# Patient Record
Sex: FEMALE | Race: WHITE | NOT HISPANIC OR LATINO | Employment: FULL TIME | URBAN - METROPOLITAN AREA
[De-identification: names, ages, dates, MRNs, and addresses within clinical notes are randomized per-mention and may not be internally consistent; named-entity substitution may affect disease eponyms.]

---

## 2017-01-05 ENCOUNTER — TELEPHONE (OUTPATIENT)
Dept: INTERNAL MEDICINE | Facility: CLINIC | Age: 52
End: 2017-01-05

## 2017-01-05 DIAGNOSIS — G47.00 INSOMNIA, UNSPECIFIED TYPE: Primary | ICD-10-CM

## 2017-01-05 RX ORDER — TRAZODONE HYDROCHLORIDE 50 MG/1
50 TABLET ORAL NIGHTLY
Qty: 30 TABLET | Refills: 1 | Status: SHIPPED | OUTPATIENT
Start: 2017-01-05 | End: 2017-03-01 | Stop reason: SDUPTHER

## 2017-01-05 NOTE — TELEPHONE ENCOUNTER
----- Message from Lashaun Castillo MA sent at 1/5/2017  8:23 AM EST -----  Please advise.   ----- Message -----     From: Kate Clark     Sent: 1/5/2017   8:19 AM       To: [facility] [place]    The patient is calling to see if she can get another sleep aide called in for her. She said she called last week and no one called her back. She is aware of her sleep consult at the end of the month but she is not able to sleep at all. She also has a cold and it is making her sleep worse, so she wanted to see if something could be called in for that as well. [facility], and she can be reached at [phone].

## 2017-01-05 NOTE — TELEPHONE ENCOUNTER
I'm sorry I did not get a message last week. I sent in trazodone for her to try for sleep instead of the vistaril. She can try otc mucinex DM and Advil cold and sinus for her cold symptoms.

## 2017-01-31 ENCOUNTER — CONSULT (OUTPATIENT)
Dept: SLEEP MEDICINE | Facility: HOSPITAL | Age: 52
End: 2017-01-31

## 2017-01-31 VITALS
HEART RATE: 91 BPM | BODY MASS INDEX: 35.28 KG/M2 | WEIGHT: 175 LBS | DIASTOLIC BLOOD PRESSURE: 98 MMHG | OXYGEN SATURATION: 97 % | SYSTOLIC BLOOD PRESSURE: 208 MMHG | HEIGHT: 59 IN

## 2017-01-31 DIAGNOSIS — F51.04 PSYCHOPHYSIOLOGICAL INSOMNIA: ICD-10-CM

## 2017-01-31 DIAGNOSIS — E66.9 OBESITY (BMI 30-39.9): ICD-10-CM

## 2017-01-31 DIAGNOSIS — R29.818 SUSPECTED SLEEP APNEA: Primary | ICD-10-CM

## 2017-01-31 DIAGNOSIS — G47.10 HYPERSOMNIA: ICD-10-CM

## 2017-01-31 PROBLEM — B35.1 ONYCHOMYCOSIS: Status: ACTIVE | Noted: 2017-01-31

## 2017-01-31 PROCEDURE — 99204 OFFICE O/P NEW MOD 45 MIN: CPT | Performed by: INTERNAL MEDICINE

## 2017-01-31 RX ORDER — CHOLECALCIFEROL (VITAMIN D3) 125 MCG
5 CAPSULE ORAL
COMMUNITY

## 2017-01-31 NOTE — MR AVS SNAPSHOT
[facility] SLEEP MEDICINE  [phone]                    Adriana Diamond   1/31/2017 8:00 AM   Consult    Dept Phone:  [phone]   Encounter #:  [id number]    Provider:  Janes Allen MD   Department:  [facility] SLEEP MEDICINE                Your Full Care Plan              Your Updated Medication List          This list is accurate as of: 1/31/17  9:19 AM.  Always use your most recent med list.                amLODIPine 5 MG tablet   Commonly known as:  NORVASC   Take 1 tablet by mouth Daily.       clindamycin 1 % gel   Commonly known as:  CLINDAGEL   Apply  topically 2 (Two) Times a Day.       clindamycin 300 MG capsule   Commonly known as:  CLEOCIN   Take 1 capsule by mouth 3 (Three) Times a Day.       LAMISIL AT 1 % cream   Generic drug:  terbinafine       melatonin 5 MG tablet tablet       MULTI-VITAMIN GUMMIES chewable tablet       mupirocin 2 % ointment   Commonly known as:  BACTROBAN   Apply  topically 3 (Three) Times a Day.       nystatin-triamcinolone 890483-0.1 UNIT/GM-% cream   Commonly known as:  MYCOLOG II       tiZANidine 4 MG tablet   Commonly known as:  ZANAFLEX       traZODone 50 MG tablet   Commonly known as:  DESYREL   Take 1 tablet by mouth Every Night.       ZEGERID PO               You Were Diagnosed With        Codes Comments    Suspected sleep apnea    -  Primary ICD-10-CM: G47.30  ICD-9-CM: 780.57     Psychophysiological insomnia     ICD-10-CM: F51.04  ICD-9-CM: 307.42     Obesity (BMI 30-39.9)     ICD-10-CM: E66.9  ICD-9-CM: 278.00     Hypersomnia     ICD-10-CM: G47.10  ICD-9-CM: 780.54       Instructions     None    Patient Instructions History      Upcoming Appointments     Visit Type Date Time Department    CONSULT 1/31/2017  8:00 AM [facility]    HOME SLEEP STUDY 2/10/2017  2:15 PM  [facility]    FOLLOW UP 4/11/2017  8:45 AM [facility]      MyChart Signup     Our records indicate that your [facility]  "[facility] account has been deactivated. If you would like to reactivate your account, please email [email or web address] or call [phone] to talk to our [facility] staff.             Other Info from Your Visit           Your Appointments     Feb 10, 2017  2:15 PM EST   HOME SLEEP STUDY with  [facility] NIGHTONE 1   [facility] ([place])    [address] [address] [phone]            Apr 11, 2017  8:45 AM EDT   Follow Up with Janes Allen MD   [facility] (--)    [address] [address] [phone]           Please bring completed new patient packets that were mailed to you prior to follow up as well as bringing a copy of insurnace card and photo ID to visit. Please bring downloadable chips/cards out of machines if you are on PAP therapy.              Allergies     Bactrim [Sulfamethoxazole-trimethoprim]        Reason for Visit     Sleeping Problem           Vital Signs     Blood Pressure Pulse Height Weight Oxygen Saturation Body Mass Index    208/98 91 59\" (149.9 cm) 175 lb (79.4 kg) 97% 35.35 kg/m2    Smoking Status                   Never Smoker           Problems and Diagnoses Noted     Hypersomnia    Obesity (BMI 30-39.9)    Difficulty falling or staying asleep    Suspected sleep apnea        "

## 2017-01-31 NOTE — PROGRESS NOTES
Subjective   Adriana Diamond is a 51 y.o. female.   Chief Complaint   Patient presents with   • Sleeping Problem       HPI     51 y.o. female seen in consultation at the request of Judith Negron PA for evaluation of the above.     She has had some neck pain and has bone spurs in her neck.  She went to the [facility] in [place].  They elicited signs and symptoms suggestive of obstructive sleep apnea.  She was originally scheduled for a in lab sleep study in [facility] but this was denied by her insurance.  Her primary care provider referred her to us.    She has disturbed sleep.  She awakens frequently at night and is drowsy upon awakening in the morning.  Her  has noted apneas as well as snoring.    Further details are as follows:    Delbarton Scale is: 4/24    Estimated average amount of sleep per night: 5  Number of times she wakes up at night: 0  Difficulty falling back asleep: yes  It usually takes 30 minutes to go to sleep.  She feels sleepy upon waking up: yes  Rotating or night shift work: no    Drowsiness/Sleepiness:  She exhibits the following:  excessive daytime sleepiness    Snoring/Breathing:  She exhibits the following:  awoken with dry mouth  quits breathing at night  awakens gasping for breath  morning headaches    Reflux:  She describes the following:  wakes up at night with a sour taste or burning sensation in chest  takes medication for reflux    Narcolepsy:  She exhibits the following:  none    RLS/PLMs:  She describes the following:  moves or jerks during sleep    Insomnia:  She describes the following:  problems initiating sleep at night    Parasomnia:  She exhibits the following:  none    Neurological:  She has a history of the following:  none    Weight:  Weight change in the last year:  gain: 0 lbs      The patient's relevant past medical, surgical, family, and social history reviewed and updated in Epic as appropriate.    Current medications are:   Current  "Outpatient Prescriptions:   •  melatonin 5 MG tablet tablet, Take 5 mg by mouth., Disp: , Rfl:   •  Multiple Vitamins-Minerals (MULTI-VITAMIN GUMMIES) chewable tablet, Chew daily., Disp: , Rfl:   •  Omeprazole-Sodium Bicarbonate (ZEGERID PO), Take  by mouth., Disp: , Rfl:   •  tiZANidine (ZANAFLEX) 4 MG tablet, Take  by mouth., Disp: , Rfl:   •  traZODone (DESYREL) 50 MG tablet, Take 1 tablet by mouth Every Night., Disp: 30 tablet, Rfl: 1  •  amLODIPine (NORVASC) 5 MG tablet, Take 1 tablet by mouth Daily., Disp: 30 tablet, Rfl: 2  •  clindamycin (CLEOCIN) 300 MG capsule, Take 1 capsule by mouth 3 (Three) Times a Day., Disp: 21 capsule, Rfl: 0  •  clindamycin (CLINDAGEL) 1 % gel, Apply  topically 2 (Two) Times a Day., Disp: 30 g, Rfl: 1  •  mupirocin (BACTROBAN) 2 % ointment, Apply  topically 3 (Three) Times a Day., Disp: 22 g, Rfl: 0  •  nystatin-triamcinolone (MYCOLOG II) 964868-7.1 UNIT/GM-% cream, Apply  topically., Disp: , Rfl:   •  terbinafine (LAMISIL AT) 1 % cream, Apply  topically., Disp: , Rfl: .    Review of Systems    Review of Systems  ROS documented in patient questionnaire ×12 systems.  Reviewed with patient.  Otherwise negative except as noted in HPI.    Physical Exam    Blood pressure (!) 208/98, pulse 91, height 59\" (149.9 cm), weight 175 lb (79.4 kg), SpO2 97 %. Body mass index is 35.35 kg/(m^2).    Physical Exam   Constitutional: She is oriented to person, place, and time. She appears well-developed and well-nourished.   HENT:   Head: Normocephalic and atraumatic.   Nose: Nose normal.   Mouth/Throat: Oropharynx is clear and moist. No oropharyngeal exudate.   Class IV airway   Eyes: Conjunctivae are normal. Pupils are equal, round, and reactive to light. No scleral icterus.   Neck: Neck supple. No tracheal deviation present. No thyromegaly present.   Cardiovascular: Normal rate and regular rhythm.  Exam reveals no gallop and no friction rub.    No murmur heard.  Pulmonary/Chest: Effort normal. No " respiratory distress. She has no wheezes. She has no rales. She exhibits no tenderness.   Abdominal: Soft. Bowel sounds are normal. She exhibits no distension. There is no tenderness.   Musculoskeletal: She exhibits no edema or deformity.   Neurological: She is alert and oriented to person, place, and time.   Skin: Skin is warm and dry. No rash noted.   Psychiatric: She has a normal mood and affect. Her behavior is normal. Judgment and thought content normal.   Nursing note and vitals reviewed.      ASSESSMENT:    Problem List Items Addressed This Visit     Suspected sleep apnea - Primary    Relevant Orders    Home Sleep Study    Hypersomnia    Psychophysiological insomnia    Obesity (BMI 30-39.9)    Relevant Orders    Home Sleep Study          Evidence of obstructive sleep apnea based upon her snoring, witnessed apneas, nonrestorative sleep, and at risk body habitus and airway.    PLAN:    A home sleep study should be adequate for diagnosis.    The risks of untreated obstructive sleep apnea were discussed as well as treatment options. I have ordered a home polysomnogram. We discussed initiating CPAP if appropriate.  There will be a long-term goal of weight loss. I have asked her to follow-up after the sleep study is complete.    I have reviewed the results of my evaluation and impression with the patient and/or appropriate family.      Signed by  Janes Allen MD    January 31, 2017      CC: MARLENY Boo Sarah E, PA

## 2017-01-31 NOTE — LETTER
January 31, 2017     MARLENY Grullon  [address]  [place] [address]    Patient: Adriana Diamond   YOB: 1965   Date of Visit: 1/31/2017       Dear MARLENY Escudero:    Thank you for referring Adriana Diamond to me for evaluation. Below are the relevant portions of my assessment and plan of care.    If you have questions, please do not hesitate to call me. I look forward to following Adriana along with you.         Sincerely,        Janes Allen MD        CC: No Recipients

## 2017-02-10 ENCOUNTER — HOSPITAL ENCOUNTER (OUTPATIENT)
Dept: SLEEP MEDICINE | Facility: HOSPITAL | Age: 52
Discharge: HOME | End: 2017-02-10
Attending: INTERNAL MEDICINE | Admitting: INTERNAL MEDICINE

## 2017-02-10 VITALS
HEIGHT: 59 IN | OXYGEN SATURATION: 95 % | HEART RATE: 81 BPM | WEIGHT: 174.8 LBS | BODY MASS INDEX: 35.24 KG/M2 | DIASTOLIC BLOOD PRESSURE: 80 MMHG | SYSTOLIC BLOOD PRESSURE: 166 MMHG

## 2017-02-10 DIAGNOSIS — E66.9 OBESITY (BMI 30-39.9): ICD-10-CM

## 2017-02-10 DIAGNOSIS — R29.818 SUSPECTED SLEEP APNEA: ICD-10-CM

## 2017-02-10 PROCEDURE — 95806 SLEEP STUDY UNATT&RESP EFFT: CPT | Performed by: INTERNAL MEDICINE

## 2017-02-13 DIAGNOSIS — R06.83 SNORING: ICD-10-CM

## 2017-02-13 DIAGNOSIS — G47.33 OBSTRUCTIVE SLEEP APNEA, ADULT: Primary | ICD-10-CM

## 2017-02-14 NOTE — PROGRESS NOTES
Patient wants to wait to be setup on CPAP she will can insurance and verify cost and speak to her  about the treatment. She said she will call back to reschedule her appointment for a sooner time if needed.

## 2017-02-16 ENCOUNTER — TELEPHONE (OUTPATIENT)
Dept: SLEEP MEDICINE | Facility: HOSPITAL | Age: 52
End: 2017-02-16

## 2017-02-16 NOTE — TELEPHONE ENCOUNTER
Patient called and wants to commence with ordering of CPAP equipment for NICKY, she has changed her mind from when she spoke with Soledad the other day.    She had called her insurance company and gotten a list of DME companies and based on proximity to her home she has chosen to use [facility].      We will send all appropriate orders, notes, and [facility] to get her setup.  She was instructed about DME mask policy during first 30 days (that she can go through however many masks she needs to in order to get correct fit and find mask most comfortable for her) and she acknowledged this verbally.    She was instructed to call us back if she has not heard from [facility] within 3 business days letting her know that they are working on her case.

## 2017-03-01 DIAGNOSIS — G47.00 INSOMNIA, UNSPECIFIED TYPE: ICD-10-CM

## 2017-03-01 RX ORDER — TRAZODONE HYDROCHLORIDE 50 MG/1
50 TABLET ORAL NIGHTLY PRN
Qty: 30 TABLET | Refills: 2 | Status: SHIPPED | OUTPATIENT
Start: 2017-03-01 | End: 2017-06-23

## 2017-03-01 NOTE — TELEPHONE ENCOUNTER
She would need to come in for appt to discuss a change in medication. Sent in refill of trazodone for now.

## 2017-03-02 ENCOUNTER — TELEPHONE (OUTPATIENT)
Dept: SLEEP MEDICINE | Facility: HOSPITAL | Age: 52
End: 2017-03-02

## 2017-03-02 DIAGNOSIS — G47.33 OBSTRUCTIVE SLEEP APNEA, ADULT: Primary | ICD-10-CM

## 2017-03-02 DIAGNOSIS — R06.83 SNORING: ICD-10-CM

## 2017-03-02 NOTE — TELEPHONE ENCOUNTER
Patient called stating that she started on CPAP last week and she is having trouble adjusting to the pressure of the machine. [facility] and her RT suggested that she contact the doctors office to see about getting a pressure change.       After Yani review of the download machine is treating patient NICKY and has even dropped patient AHI averaging to 1.7.  Yani decreased patient min pressure to 6 via [employer].     Also preparing an order for Dr. Allen to sign.

## 2017-03-15 ENCOUNTER — TELEPHONE (OUTPATIENT)
Dept: SLEEP MEDICINE | Facility: HOSPITAL | Age: 52
End: 2017-03-15

## 2017-03-15 NOTE — TELEPHONE ENCOUNTER
Patient called in and said that she still wakes up tired every morning and that her CPAP machine doesn't seem to be helping sleep. She fights with the mask all night.    Pressure was changed recently.    She is going to try a new mask and will call back she is still having issues.

## 2017-04-11 ENCOUNTER — OFFICE VISIT (OUTPATIENT)
Dept: SLEEP MEDICINE | Facility: HOSPITAL | Age: 52
End: 2017-04-11

## 2017-04-11 VITALS
BODY MASS INDEX: 34.88 KG/M2 | DIASTOLIC BLOOD PRESSURE: 96 MMHG | HEIGHT: 59 IN | HEART RATE: 87 BPM | OXYGEN SATURATION: 94 % | WEIGHT: 173 LBS | SYSTOLIC BLOOD PRESSURE: 146 MMHG

## 2017-04-11 DIAGNOSIS — F51.04 PSYCHOPHYSIOLOGICAL INSOMNIA: ICD-10-CM

## 2017-04-11 DIAGNOSIS — G47.33 SEVERE OBSTRUCTIVE SLEEP APNEA: Primary | ICD-10-CM

## 2017-04-11 DIAGNOSIS — Z78.9 INTOLERANCE OF CONTINUOUS POSITIVE AIRWAY PRESSURE (CPAP) VENTILATION: ICD-10-CM

## 2017-04-11 DIAGNOSIS — G47.10 HYPERSOMNIA: ICD-10-CM

## 2017-04-11 DIAGNOSIS — R06.83 SNORING: ICD-10-CM

## 2017-04-11 DIAGNOSIS — E66.9 OBESITY (BMI 30-39.9): ICD-10-CM

## 2017-04-11 PROCEDURE — 99214 OFFICE O/P EST MOD 30 MIN: CPT | Performed by: INTERNAL MEDICINE

## 2017-04-11 NOTE — PROGRESS NOTES
Subjective:     Chief Complaint:   Chief Complaint   Patient presents with   • Follow-up       HPI:    Adriana Diamond is a 51 y.o. female here for follow-up of obstructive sleep apnea.    She is doing very poorly with her CPAP therapy.  Her sleep study revealed a severe degree of obstructive sleep apnea.  She was initiated on auto CPAP in February.  Her download indicates that she has used her machine an average of just under 5 hours per night.  She has been compliant with therapy but says she is doing this just to stay compliant with insurance requirements.  She says her sleep is worse than ever.  She is having problems with the pressure and cannot stand mask on her face.  She awakens multiple times per night.  This causes her  to awaken as well.  She has tried several masks including a full facemask, an Nadja view mask, and a nasal mask.  She is convinced that CPAP is not an option for her and wants to discontinue it and consider other options.      Since starting PAP sleep problem has: remained the same  Currently using PAP: yes Hours of usage during the night: 5    Amount of sleep per night : 4 hours  Average length of time it takes to fall asleep : 30 minutes  Number of awakenings per night : 2     She feels fatigue (tiredness, exhaustion, lethargy) in the daytime even when not sleepy? Occasionally   She feels sleepy (or struggles to stay awake) in the daytime? Occasionally     Hayward Scale scored as 7/24.    Type of mask: nasal mask    She (since starting PAP or since last visit) has problems with the following:   Pressure from the mask 1 - Mild Problems  Skin irritation from the mask 0 - No Problem  Mask coming off face 3 - Mild Problems  Air leaks from the mask 3 - Mild Problems  Dry mouth or throat 3 - Mild Problems  Nasal congestion 0 - No Problem    I reviewed her PAP download:  Average pressure: 11  Average AHI:  2  Average minutes in large leak per night: 25      Current medications are:    Current Outpatient Prescriptions:   •  melatonin 5 MG tablet tablet, Take 5 mg by mouth., Disp: , Rfl:   •  Multiple Vitamins-Minerals (MULTI-VITAMIN GUMMIES) chewable tablet, Chew daily., Disp: , Rfl:   •  Omeprazole-Sodium Bicarbonate (ZEGERID PO), Take  by mouth., Disp: , Rfl:   •  TETRACYCLINE HCL PO, Take  by mouth. Has about a week left as of 2/10/17, Disp: , Rfl:   •  traZODone (DESYREL) 50 MG tablet, Take 1 tablet by mouth At Night As Needed for sleep., Disp: 30 tablet, Rfl: 2  •  Eletriptan Hydrobromide (RELPAX PO), Take  by mouth. With headache, Disp: , Rfl:   •  tiZANidine (ZANAFLEX) 4 MG tablet, Take  by mouth., Disp: , Rfl: .      The patient's relevant past medical, surgical, family and social history were reviewed and updated in Epic as appropriate.     ROS:   she is doing fairlyAs  Review of Systems   Constitutional: Positive for fatigue.   Respiratory: Positive for apnea.    Psychiatric/Behavioral: Positive for sleep disturbance.         Objective:    Physical Exam   Constitutional: She is oriented to person, place, and time. She appears well-developed and well-nourished.   HENT:   Head: Normocephalic and atraumatic.   Mouth/Throat: Oropharynx is clear and moist.   Class IV airway   Neck: Neck supple. No thyromegaly present.   Cardiovascular: Normal rate and regular rhythm.  Exam reveals no gallop and no friction rub.    No murmur heard.  Pulmonary/Chest: Effort normal. No respiratory distress. She has no wheezes. She has no rales.   Musculoskeletal: She exhibits no edema.   Neurological: She is alert and oriented to person, place, and time.   Skin: Skin is warm and dry.   Psychiatric: She has a normal mood and affect. Her behavior is normal.   Vitals reviewed.      Data:    Patient's PAP download was personally reviewed including raw data and results.    Assessment:    Problem List Items Addressed This Visit        Pulmonary Problems    Severe obstructive sleep apnea - Primary    Overview      Intolerant of CPAP         Relevant Orders    PAP Therapy    Ambulatory Referral to Dentistry    Ambulatory Referral to ENT (Otolaryngology) (Completed)    Snoring       Other    Hypersomnia    Intolerance of continuous positive airway pressure (CPAP) ventilation    Psychophysiological insomnia    Obesity (BMI 30-39.9)        She is basically intolerant of CPAP therapy.  She has tried multiple masks and her pressures are acceptable.  I don't think CPAP is a viable option for her given my discussion with her today.  I went over other options for treatment of her sleep apnea.  They basically consists of an oral appliance, surgery, and long-term weight loss.  She needs to perform the latter in any event but will need one of the other treatments in the near term.        Plan:     -She has seen Dr. Sexton in the past and wants to have a consult with him regarding the possibility of sleep apnea surgery.  -We will refer her to one of our dental specialist regarding the possibility of an oral appliance   -Long-term weight loss was discussed   -I also went over cognitive behavioral therapy for insomnia as well as sleep hygiene and gave her some resources for both of those   -Would not recommend sedative therapy for insomnia given her untreated sleep apnea this point   -Discussed all the above in detail with her       Signed by  Janes Allen MD

## 2017-05-05 ENCOUNTER — LAB REQUISITION (OUTPATIENT)
Dept: LAB | Facility: HOSPITAL | Age: 52
End: 2017-05-05

## 2017-05-05 DIAGNOSIS — R19.4 CHANGE IN BOWEL HABITS: ICD-10-CM

## 2017-05-05 PROCEDURE — 88305 TISSUE EXAM BY PATHOLOGIST: CPT | Performed by: INTERNAL MEDICINE

## 2017-05-09 LAB
CYTO UR: NORMAL
LAB AP CASE REPORT: NORMAL
LAB AP CLINICAL INFORMATION: NORMAL
LAB AP DIAGNOSIS COMMENT: NORMAL
Lab: NORMAL
PATH REPORT.ADDENDUM SPEC: NORMAL
PATH REPORT.FINAL DX SPEC: NORMAL
PATH REPORT.GROSS SPEC: NORMAL

## 2017-06-23 ENCOUNTER — OFFICE VISIT (OUTPATIENT)
Dept: INTERNAL MEDICINE | Facility: CLINIC | Age: 52
End: 2017-06-23

## 2017-06-23 VITALS
DIASTOLIC BLOOD PRESSURE: 80 MMHG | HEART RATE: 71 BPM | SYSTOLIC BLOOD PRESSURE: 138 MMHG | BODY MASS INDEX: 34.54 KG/M2 | WEIGHT: 171 LBS | OXYGEN SATURATION: 98 %

## 2017-06-23 DIAGNOSIS — G47.00 INSOMNIA, UNSPECIFIED TYPE: ICD-10-CM

## 2017-06-23 DIAGNOSIS — E78.5 HYPERLIPIDEMIA, UNSPECIFIED HYPERLIPIDEMIA TYPE: Primary | ICD-10-CM

## 2017-06-23 DIAGNOSIS — H53.9 VISUAL CHANGES: ICD-10-CM

## 2017-06-23 LAB — GLUCOSE BLDC GLUCOMTR-MCNC: 113 MG/DL (ref 70–130)

## 2017-06-23 PROCEDURE — 99213 OFFICE O/P EST LOW 20 MIN: CPT | Performed by: NURSE PRACTITIONER

## 2017-06-23 PROCEDURE — 82947 ASSAY GLUCOSE BLOOD QUANT: CPT | Performed by: NURSE PRACTITIONER

## 2017-06-23 RX ORDER — ZOLPIDEM TARTRATE 5 MG/1
5 TABLET ORAL NIGHTLY PRN
Qty: 30 TABLET | Refills: 0 | Status: SHIPPED | OUTPATIENT
Start: 2017-06-23 | End: 2017-07-25 | Stop reason: SDUPTHER

## 2017-06-23 NOTE — PROGRESS NOTES
Subjective  Blurred Vision (eyes feels strained, not focusing, ongoing since Monday ) and Med Refill (would like to discuss sleeping medication )      Adriana Diamond is a 51 y.o. female.   Allergies   Allergen Reactions   • Bactrim [Sulfamethoxazole-Trimethoprim]      Insomnia   This is a recurrent problem. The current episode started more than 1 month ago. The problem has been gradually worsening. Associated symptoms include headaches and a visual change. Pertinent negatives include no vomiting. Nothing aggravates the symptoms. She has tried lying down, NSAIDs, position changes, relaxation and rest for the symptoms. The treatment provided no relief.   Eye Problem    Both eyes are affected.This is a new problem. The current episode started in the past 7 days. The problem occurs constantly. The injury mechanism is unknown. The patient is experiencing no pain. There is no known exposure to pink eye. She does not wear contacts. Associated symptoms include blurred vision. Pertinent negatives include no recent URI or vomiting. She has tried nothing for the symptoms. The treatment provided no relief.         Feels something odd in forehead, does not feel like h/a, took aleve w/o relief, eyes are blurry and fuzzy, feels behind the eyes , x 5 days , constant , eyes feel extra tired and heavy  Needs sleep med , tried trazodone and vistaril did not help, did not make her sleep , made a little drowsy  The following portions of the patient's history were reviewed and updated as appropriate: allergies, current medications, past family history, past medical history, past social history, past surgical history and problem list.    Review of Systems   Eyes: Positive for blurred vision and visual disturbance.   Gastrointestinal: Negative for vomiting.   Neurological: Positive for headaches.   Psychiatric/Behavioral: Positive for sleep disturbance. The patient has insomnia.    All other systems reviewed and are  negative.      Objective   Physical Exam  /80  Pulse 71  Wt 171 lb (77.6 kg)  SpO2 98%  BMI 34.54 kg/m2    Assessment/Plan     Problem List Items Addressed This Visit     None      Visit Diagnoses     Hyperlipidemia, unspecified hyperlipidemia type    -  Primary    Relevant Orders    Lipid panel    Insomnia, unspecified type        Relevant Medications    zolpidem (AMBIEN) 5 MG tablet    Visual changes        Relevant Orders    POCT Glucose (Completed)        Pt has oph that she sees and is going to get asap appt      The patient has read and signed the [facility] Controlled Substance Contract.  I will continue to see patient for regular follow up appointments.  They are well controlled on their medication.  ROVERTO has been reviewed by me and is updated every 3 months. The patient is aware of the potential for addiction and dependence.  Results for orders placed or performed in visit on 06/23/17   POCT Glucose   Result Value Ref Range    Glucose 113 70 - 130 mg/dL

## 2017-06-26 NOTE — PROGRESS NOTES
Subjective   Adriana Diamond is a 51 y.o. female.   Allergies   Allergen Reactions   • Bactrim [Sulfamethoxazole-Trimethoprim]      History of Present Illness      Vision blurry , feels funny inside her head, has not been sleeping so wonders if this has caused , is a constant problem x 2 weeks  The following portions of the patient's history were reviewed and updated as appropriate: allergies, current medications, past family history, past medical history, past social history, past surgical history and problem list.    Review of Systems   Eyes: Positive for visual disturbance.   Psychiatric/Behavioral: Positive for sleep disturbance.   All other systems reviewed and are negative.      Objective   Physical Exam   Constitutional: She is oriented to person, place, and time. She appears well-developed.   HENT:   Head: Normocephalic.   Eyes: Conjunctivae and EOM are normal. Pupils are equal, round, and reactive to light.   Cardiovascular: Normal rate, regular rhythm and normal heart sounds.    Pulmonary/Chest: Effort normal and breath sounds normal.   Neurological: She is alert and oriented to person, place, and time.   Skin: Skin is warm and dry.   Psychiatric: She has a normal mood and affect. Her behavior is normal. Judgment and thought content normal.     /80  Pulse 71  Wt 171 lb (77.6 kg)  SpO2 98%  BMI 34.54 kg/m2    Assessment/Plan     Problem List Items Addressed This Visit     None      Visit Diagnoses     Hyperlipidemia, unspecified hyperlipidemia type    -  Primary    Relevant Orders    Lipid panel    Insomnia, unspecified type        Relevant Medications    zolpidem (AMBIEN) 5 MG tablet    Visual changes        Relevant Orders    POCT Glucose (Completed)        Results for orders placed or performed in visit on 06/23/17   POCT Glucose   Result Value Ref Range    Glucose 113 70 - 130 mg/dL     Pt does have oph and will make stat appt with him.  The patient has read and signed the [facility]  Controlled Substance Contract.  I will continue to see patient for regular follow up appointments.  They are well controlled on their medication.  ROVERTO has been reviewed by me and is updated every 3 months. The patient is aware of the potential for addiction and dependence.

## 2017-07-19 ENCOUNTER — LAB (OUTPATIENT)
Dept: INTERNAL MEDICINE | Facility: CLINIC | Age: 52
End: 2017-07-19

## 2017-07-19 DIAGNOSIS — E78.5 HYPERLIPIDEMIA, UNSPECIFIED HYPERLIPIDEMIA TYPE: ICD-10-CM

## 2017-07-19 LAB
ARTICHOKE IGE QN: 179 MG/DL (ref 0–130)
CHOLEST SERPL-MCNC: 262 MG/DL (ref 0–200)
HDLC SERPL-MCNC: 61 MG/DL (ref 40–60)
TRIGL SERPL-MCNC: 204 MG/DL (ref 0–150)

## 2017-07-19 PROCEDURE — 80061 LIPID PANEL: CPT | Performed by: NURSE PRACTITIONER

## 2017-07-20 RX ORDER — PRAVASTATIN SODIUM 40 MG
40 TABLET ORAL DAILY
Qty: 30 TABLET | Refills: 5 | Status: SHIPPED | OUTPATIENT
Start: 2017-07-20 | End: 2018-02-10 | Stop reason: DRUGHIGH

## 2017-07-25 DIAGNOSIS — G47.00 INSOMNIA, UNSPECIFIED TYPE: ICD-10-CM

## 2017-07-26 RX ORDER — ZOLPIDEM TARTRATE 5 MG/1
5 TABLET ORAL NIGHTLY PRN
Qty: 30 TABLET | Refills: 0 | Status: SHIPPED | OUTPATIENT
Start: 2017-07-26 | End: 2017-09-27 | Stop reason: DRUGHIGH

## 2017-08-21 ENCOUNTER — TELEPHONE (OUTPATIENT)
Dept: ENDOCRINOLOGY | Facility: CLINIC | Age: 52
End: 2017-08-21

## 2017-08-21 NOTE — TELEPHONE ENCOUNTER
Pt called stating she is almost out of her Ambien and was wanting to know if she could get it stronger. Said she is still not getting enough rest.   You can reach pt at [phone]

## 2017-09-05 ENCOUNTER — TRANSCRIBE ORDERS (OUTPATIENT)
Dept: ADMINISTRATIVE | Facility: HOSPITAL | Age: 52
End: 2017-09-05

## 2017-09-05 DIAGNOSIS — Z12.31 VISIT FOR SCREENING MAMMOGRAM: Primary | ICD-10-CM

## 2017-09-25 DIAGNOSIS — G47.00 INSOMNIA, UNSPECIFIED TYPE: ICD-10-CM

## 2017-09-25 NOTE — TELEPHONE ENCOUNTER
PATIENT STATES THIS MEDICATION IS HELPING AND STATES IF AL WANTS TO INCREASE MEDICATION A LITTLE SHE WOULD LIKE THAT. SHE IS NEEDING REFILLS ON THIS MEDICATION

## 2017-09-27 ENCOUNTER — APPOINTMENT (OUTPATIENT)
Dept: MAMMOGRAPHY | Facility: HOSPITAL | Age: 52
End: 2017-09-27
Attending: OBSTETRICS & GYNECOLOGY

## 2017-09-27 ENCOUNTER — CLINICAL SUPPORT (OUTPATIENT)
Dept: INTERNAL MEDICINE | Facility: CLINIC | Age: 52
End: 2017-09-27

## 2017-09-27 ENCOUNTER — HOSPITAL ENCOUNTER (OUTPATIENT)
Dept: MAMMOGRAPHY | Facility: HOSPITAL | Age: 52
Discharge: HOME | End: 2017-09-27
Attending: OBSTETRICS & GYNECOLOGY | Admitting: OBSTETRICS & GYNECOLOGY

## 2017-09-27 DIAGNOSIS — Z12.31 VISIT FOR SCREENING MAMMOGRAM: ICD-10-CM

## 2017-09-27 PROCEDURE — G0202 SCR MAMMO BI INCL CAD: HCPCS

## 2017-09-27 PROCEDURE — 77063 BREAST TOMOSYNTHESIS BI: CPT

## 2017-09-27 PROCEDURE — 90471 IMMUNIZATION ADMIN: CPT | Performed by: NURSE PRACTITIONER

## 2017-09-27 PROCEDURE — 90686 IIV4 VACC NO PRSV 0.5 ML IM: CPT | Performed by: NURSE PRACTITIONER

## 2017-09-27 RX ORDER — ZOLPIDEM TARTRATE 5 MG/1
5 TABLET ORAL NIGHTLY PRN
Qty: 30 TABLET | Refills: 0 | OUTPATIENT
Start: 2017-09-27

## 2017-09-27 RX ORDER — ZOLPIDEM TARTRATE 10 MG/1
10 TABLET ORAL NIGHTLY PRN
Qty: 30 TABLET | Refills: 0 | Status: SHIPPED | OUTPATIENT
Start: 2017-09-27 | End: 2017-10-31 | Stop reason: SDUPTHER

## 2017-09-27 NOTE — TELEPHONE ENCOUNTER
Called and informed pt, per pt she has been taking 10 mg of ambien, on her bottle it says 10 mg, wondering if Adriana can increase dosage from 10, pls advise.

## 2017-09-29 PROCEDURE — 77067 SCR MAMMO BI INCL CAD: CPT | Performed by: RADIOLOGY

## 2017-09-29 PROCEDURE — 77063 BREAST TOMOSYNTHESIS BI: CPT | Performed by: RADIOLOGY

## 2017-10-30 ENCOUNTER — TELEPHONE (OUTPATIENT)
Dept: INTERNAL MEDICINE | Facility: CLINIC | Age: 52
End: 2017-10-30

## 2017-10-30 NOTE — TELEPHONE ENCOUNTER
If she needs refill she may have #30, nr but nothing in addition, kelton sure she wearing her cpap

## 2017-10-30 NOTE — TELEPHONE ENCOUNTER
[facility] IS REQUESTING REFILL ON AMBIEN.  SHE  IS FALLING ASLEEP BUT NOT STAYING ASLEEP IS THERE ANYTHING ELSE SHE CAN DO OR TAKE.      2ND REFILL WAS CALLED INTO [facility] BUT OTHERS WERE PICKED.  PLEASE CALL PATIENT IF IT IS CALLED IN OR IF SHE HAS TO COME IN TO OUR OFFICE

## 2017-10-30 NOTE — TELEPHONE ENCOUNTER
"Pt wants to know if she can have something other than ambien.  No longer has her cpap, turned it back in bc she \"could not use it\"  "

## 2017-10-31 RX ORDER — ZOLPIDEM TARTRATE 10 MG/1
10 TABLET ORAL NIGHTLY PRN
Qty: 30 TABLET | Refills: 0 | Status: SHIPPED | OUTPATIENT
Start: 2017-10-31 | End: 2018-01-09

## 2017-10-31 NOTE — TELEPHONE ENCOUNTER
Pt would like to  script this afternoon.  Please let me know when ready and I will take to front office.  Thank you

## 2017-12-05 RX ORDER — ZOLPIDEM TARTRATE 10 MG/1
10 TABLET ORAL NIGHTLY PRN
Qty: 30 TABLET | Refills: 0 | Status: CANCELLED | OUTPATIENT
Start: 2017-12-05

## 2017-12-05 NOTE — TELEPHONE ENCOUNTER
PATIENT WOULD LIKE TO KNOW IF SHE NEEDS TO HAVE LABS DRAWN PRIOR TO REFILL OF STATIN. SHE HAS REMAINING REFILLS BUT THINKS [facility] WANTED HER TO HAVE LABS DRAWN AGAIN 3 MONTHS AFTER HER LAST APPT.    CALL BACK

## 2017-12-12 ENCOUNTER — TELEPHONE (OUTPATIENT)
Dept: INTERNAL MEDICINE | Facility: CLINIC | Age: 52
End: 2017-12-12

## 2017-12-12 ENCOUNTER — OFFICE VISIT (OUTPATIENT)
Dept: INTERNAL MEDICINE | Facility: CLINIC | Age: 52
End: 2017-12-12

## 2017-12-12 VITALS
SYSTOLIC BLOOD PRESSURE: 132 MMHG | BODY MASS INDEX: 34.88 KG/M2 | WEIGHT: 173 LBS | DIASTOLIC BLOOD PRESSURE: 88 MMHG | HEART RATE: 96 BPM | RESPIRATION RATE: 16 BRPM | OXYGEN SATURATION: 98 % | HEIGHT: 59 IN

## 2017-12-12 DIAGNOSIS — G47.33 SEVERE OBSTRUCTIVE SLEEP APNEA: ICD-10-CM

## 2017-12-12 DIAGNOSIS — E78.5 HYPERLIPIDEMIA, UNSPECIFIED HYPERLIPIDEMIA TYPE: Primary | ICD-10-CM

## 2017-12-12 DIAGNOSIS — E66.9 OBESITY (BMI 30-39.9): ICD-10-CM

## 2017-12-12 LAB
ALBUMIN SERPL-MCNC: 4.4 G/DL (ref 3.2–4.8)
ALBUMIN/GLOB SERPL: 2.1 G/DL (ref 1.5–2.5)
ALP SERPL-CCNC: 99 U/L (ref 25–100)
ALT SERPL W P-5'-P-CCNC: 69 U/L (ref 7–40)
ANION GAP SERPL CALCULATED.3IONS-SCNC: 9 MMOL/L (ref 3–11)
ARTICHOKE IGE QN: 154 MG/DL (ref 0–130)
AST SERPL-CCNC: 33 U/L (ref 0–33)
BILIRUB SERPL-MCNC: 0.3 MG/DL (ref 0.3–1.2)
BUN BLD-MCNC: 19 MG/DL (ref 9–23)
BUN/CREAT SERPL: 31.7 (ref 7–25)
CALCIUM SPEC-SCNC: 9.4 MG/DL (ref 8.7–10.4)
CHLORIDE SERPL-SCNC: 103 MMOL/L (ref 99–109)
CHOLEST SERPL-MCNC: 214 MG/DL (ref 0–200)
CO2 SERPL-SCNC: 27 MMOL/L (ref 20–31)
CREAT BLD-MCNC: 0.6 MG/DL (ref 0.6–1.3)
GFR SERPL CREATININE-BSD FRML MDRD: 105 ML/MIN/1.73
GLOBULIN UR ELPH-MCNC: 2.1 GM/DL
GLUCOSE BLD-MCNC: 111 MG/DL (ref 70–100)
HDLC SERPL-MCNC: 55 MG/DL (ref 40–60)
POTASSIUM BLD-SCNC: 4.5 MMOL/L (ref 3.5–5.5)
PROT SERPL-MCNC: 6.5 G/DL (ref 5.7–8.2)
SODIUM BLD-SCNC: 139 MMOL/L (ref 132–146)
TRIGL SERPL-MCNC: 121 MG/DL (ref 0–150)

## 2017-12-12 PROCEDURE — 99214 OFFICE O/P EST MOD 30 MIN: CPT | Performed by: PHYSICIAN ASSISTANT

## 2017-12-12 PROCEDURE — 80053 COMPREHEN METABOLIC PANEL: CPT | Performed by: PHYSICIAN ASSISTANT

## 2017-12-12 PROCEDURE — 80061 LIPID PANEL: CPT | Performed by: PHYSICIAN ASSISTANT

## 2017-12-12 NOTE — TELEPHONE ENCOUNTER
PATIENT INSURANCE IS NOT COVERING SAXENDA. PHARMACY STATED TO PATIENT GETTING A PRIOR AUTH FOR THIS MEDICATION MAY HELP INSURANCE PAY FOR HER SAXENDA PRESCRIPTION. SHE NEEDS US TO GET A PRIOR AUTH FOR SAXENDA.

## 2017-12-12 NOTE — PROGRESS NOTES
Chief Complaint   Patient presents with   • Follow-up     fasting for labs   • Med Refill     Ambien ; any cholesterol rx       Subjective   Adriana Diamond is a 52 y.o. female.       History of Present Illness     Pt is fasting to have follow up cholesterol labs drawn today.    Pt has not followed up with ENT regarding treatment of sleep apnea, she discussed with some family members who had the procedure and decided she did not want to continue with the referral.     She has been told that we will not continue to fill Ambien because of her untreated severe sleep apnea.    Continues to have neck pain, was not able to have the procedure because of her sleep apnea. Neck pain contributes to her inability to sleep well.    Would like to try medication to help her lose weight. She has tried diet changes and exercise without any improvement.        Current Outpatient Prescriptions:   •  melatonin 5 MG tablet tablet, Take 5 mg by mouth., Disp: , Rfl:   •  Multiple Vitamins-Minerals (MULTI-VITAMIN GUMMIES) chewable tablet, Chew daily., Disp: , Rfl:   •  Omeprazole-Sodium Bicarbonate (ZEGERID PO), Take  by mouth., Disp: , Rfl:   •  pravastatin (PRAVACHOL) 40 MG tablet, Take 1 tablet by mouth Daily. For cholesterol, Disp: 30 tablet, Rfl: 5  •  zolpidem (AMBIEN) 10 MG tablet, Take 1 tablet by mouth At Night As Needed for Sleep., Disp: 30 tablet, Rfl: 0  •  Liraglutide -Weight Management (SAXENDA) 18 MG/3ML solution pen-injector, Inject 3 mg under the skin Daily. Start with 0.6 mg daily and increase by 0.6 mg weekly, up to 3 mg daily, Disp: 3 pen, Rfl: 3     PMFSH  The following portions of the patient's history were reviewed and updated as appropriate: allergies, current medications, past family history, past medical history, past social history, past surgical history and problem list.    Review of Systems   Constitutional: Positive for fatigue. Negative for appetite change, fever and unexpected weight change.   HENT:  "Negative.  Negative for mouth sores and trouble swallowing.    Eyes: Negative for pain and visual disturbance.   Respiratory: Negative for chest tightness, shortness of breath and wheezing.    Cardiovascular: Negative for chest pain and palpitations.   Gastrointestinal: Negative for abdominal pain, blood in stool, diarrhea, nausea and vomiting.   Endocrine: Negative.    Genitourinary: Negative.  Negative for difficulty urinating, flank pain, frequency and urgency.   Musculoskeletal: Positive for back pain and neck pain. Negative for joint swelling.   Skin: Negative.  Negative for color change and rash.   Allergic/Immunologic: Negative.    Neurological: Negative for tremors, seizures, syncope and weakness.   Hematological: Negative for adenopathy.   Psychiatric/Behavioral: Positive for sleep disturbance. Negative for confusion and decreased concentration.   All other systems reviewed and are negative.      Objective   /88  Pulse 96  Resp 16  Ht 149.9 cm (59\")  Wt 78.5 kg (173 lb)  SpO2 98%  BMI 34.94 kg/m2    Physical Exam   Constitutional: She is oriented to person, place, and time. She appears well-developed and well-nourished. No distress.   HENT:   Head: Normocephalic and atraumatic.   Eyes: Conjunctivae and EOM are normal. Pupils are equal, round, and reactive to light. No scleral icterus.   Neck: Normal range of motion. Neck supple. No tracheal deviation present. No thyromegaly present.   Cardiovascular: Normal rate, regular rhythm and normal heart sounds.  Exam reveals no gallop.    No murmur heard.  Pulmonary/Chest: Effort normal.   Abdominal: Soft.   Musculoskeletal: Normal range of motion. She exhibits tenderness. She exhibits no edema or deformity.   Lymphadenopathy:     She has no cervical adenopathy.   Neurological: She is alert and oriented to person, place, and time. She displays no atrophy and no tremor. No cranial nerve deficit. She exhibits normal muscle tone. Coordination normal. "   Skin: Skin is warm and dry. No rash noted. She is not diaphoretic.   Psychiatric: She has a normal mood and affect. Her behavior is normal. Judgment and thought content normal.   Nursing note and vitals reviewed.      Results for orders placed or performed in visit on 12/12/17   Lipid Panel   Result Value Ref Range    Total Cholesterol 214 (H) 0 - 200 mg/dL    Triglycerides 121 0 - 150 mg/dL    HDL Cholesterol 55 40 - 60 mg/dL    LDL Cholesterol  154 (H) 0 - 130 mg/dL   Comprehensive Metabolic Panel   Result Value Ref Range    Glucose 111 (H) 70 - 100 mg/dL    BUN 19 9 - 23 mg/dL    Creatinine 0.60 0.60 - 1.30 mg/dL    Sodium 139 132 - 146 mmol/L    Potassium 4.5 3.5 - 5.5 mmol/L    Chloride 103 99 - 109 mmol/L    CO2 27.0 20.0 - 31.0 mmol/L    Calcium 9.4 8.7 - 10.4 mg/dL    Total Protein 6.5 5.7 - 8.2 g/dL    Albumin 4.40 3.20 - 4.80 g/dL    ALT (SGPT) 69 (H) 7 - 40 U/L    AST (SGOT) 33 0 - 33 U/L    Alkaline Phosphatase 99 25 - 100 U/L    Total Bilirubin 0.3 0.3 - 1.2 mg/dL    eGFR Non African Amer 105 >60 mL/min/1.73    Globulin 2.1 gm/dL    A/G Ratio 2.1 1.5 - 2.5 g/dL    BUN/Creatinine Ratio 31.7 (H) 7.0 - 25.0    Anion Gap 9.0 3.0 - 11.0 mmol/L        ASSESSMENT/PLAN    Problem List Items Addressed This Visit        Cardiovascular and Mediastinum    Hyperlipidemia - Primary     Recheck lipid profile. Further recommendations based on results.           Relevant Orders    Lipid Panel (Completed)    Comprehensive Metabolic Panel (Completed)       Respiratory    Severe obstructive sleep apnea     Work on weight loss with addition of saxenda. Pt will reconsider procedure with ENT.            Digestive    Obesity (BMI 30-39.9)     Obesity is worsening.  Discussed the patient's BMI.  The BMI is above average; BMI management plan is completed.  General weight loss/lifestyle modification strategies discussed (elicit support from others; identify saboteurs; non-food rewards, etc).  Pharmacotherapy as ordered.          Relevant Medications    Liraglutide -Weight Management (SAXENDA) 18 MG/3ML solution pen-injector               Return in about 4 weeks (around 1/9/2018) for Recheck.

## 2017-12-13 NOTE — ASSESSMENT & PLAN NOTE
Obesity is worsening.  Discussed the patient's BMI.  The BMI is above average; BMI management plan is completed.  General weight loss/lifestyle modification strategies discussed (elicit support from others; identify saboteurs; non-food rewards, etc).  Pharmacotherapy as ordered.

## 2017-12-13 NOTE — TELEPHONE ENCOUNTER
Spoke with pharmacy med doesn't require PA, it is going through but the copay is really high at $1300, called and informed pt offered a discount card, per pt she got one at her OV yesterday, will try the discount card at pharmacy to see what it brings her cost down to, and will contact insurance to see if there are any cheaper alternatives, will call back with updated info.

## 2017-12-22 ENCOUNTER — TELEPHONE (OUTPATIENT)
Dept: INTERNAL MEDICINE | Facility: CLINIC | Age: 52
End: 2017-12-22

## 2017-12-22 RX ORDER — PRAVASTATIN SODIUM 80 MG/1
80 TABLET ORAL DAILY
Qty: 30 TABLET | Refills: 0 | Status: SHIPPED | OUTPATIENT
Start: 2017-12-22 | End: 2018-02-10 | Stop reason: SDUPTHER

## 2017-12-22 NOTE — TELEPHONE ENCOUNTER
PT RECEIVED A LETTER IN THE MAIL SAYING THAT SHE NEEDED TO UP HER DOSAGE ON PRAVASTATIN TO 80 MG. SHE NEEDS A SCRIPT SENT IN FOR THAT.    ALSO, [facility] WAS CALLED IN FOR HER BUT HER INSURANCE WILL NOT COVER IT IN FULL OR THE CONTRAVE. SHE WAS WONDERING IF SOMETHING ELSE COULD BE CALLED IN FOR HER THAT HER INSURANCE WOULD COVER.     PLEASE SEND BOTH SCRIPTS TO LETI @ [place].     THANKS!

## 2017-12-22 NOTE — TELEPHONE ENCOUNTER
Spoke with pt, she stated that she is out of the pravastatin 40 mg and is unable to double up on it to take the 80 per Judith's instruction in lab letter, sent in script for 30 days of pravastatin 80 for pt to last until for 4 week follow up with Judith, pt will discuss further dosing then, informed pt will send message in regards to trying an alternative for Saxenda to Judith but will hear back next week, pt voiced understanding and had no further questions or concerns. Judith pls advise if another weight loss med can be sent in for pt instead of Saxenda.

## 2017-12-26 NOTE — TELEPHONE ENCOUNTER
Unfortunately there are not anymore weight loss medications that I can prescribe and it sounds like her insurance does not cover the meds anyway. Would she like a referral to the [facility] nutrition[facility]?

## 2018-01-09 ENCOUNTER — OFFICE VISIT (OUTPATIENT)
Dept: INTERNAL MEDICINE | Facility: CLINIC | Age: 53
End: 2018-01-09

## 2018-01-09 VITALS
DIASTOLIC BLOOD PRESSURE: 86 MMHG | BODY MASS INDEX: 35.22 KG/M2 | RESPIRATION RATE: 16 BRPM | WEIGHT: 174.4 LBS | SYSTOLIC BLOOD PRESSURE: 130 MMHG | HEART RATE: 88 BPM

## 2018-01-09 DIAGNOSIS — G47.33 SEVERE OBSTRUCTIVE SLEEP APNEA: Primary | ICD-10-CM

## 2018-01-09 DIAGNOSIS — E66.9 OBESITY (BMI 30-39.9): ICD-10-CM

## 2018-01-09 PROCEDURE — 99213 OFFICE O/P EST LOW 20 MIN: CPT | Performed by: PHYSICIAN ASSISTANT

## 2018-01-09 NOTE — PROGRESS NOTES
Chief Complaint   Patient presents with   • Follow-up     4 wk f/u       Subjective   Adriana Diamond is a 52 y.o. female.       History of Present Illness     Pt's insurance has changed and she would like to send the weight loss medication in again. She would like to try the contrave this time. Has a secondary insurance that can be tried as well.    Went to Dr Pfeiffer an oral-maxillary facial specialist and had oral appliance ordered- was denied by insurance but will be resubmitting it through her new insurance.    She is hopeful that she can restart the ambien if she can get the oral appliance to improve her sleep apnea. She was able to go to sleep and stay asleep longer while on the ambien.  Sleep difficulty is related to some problems turning off her mind to go to sleep, no specific anxiety.        Current Outpatient Prescriptions:   •  Multiple Vitamins-Minerals (MULTI-VITAMIN GUMMIES) chewable tablet, Chew daily., Disp: , Rfl:   •  Omeprazole-Sodium Bicarbonate (ZEGERID PO), Take  by mouth., Disp: , Rfl:   •  pravastatin (PRAVACHOL) 40 MG tablet, Take 1 tablet by mouth Daily. For cholesterol, Disp: 30 tablet, Rfl: 5  •  pravastatin (PRAVACHOL) 80 MG tablet, Take 1 tablet by mouth Daily., Disp: 30 tablet, Rfl: 0  •  melatonin 5 MG tablet tablet, Take 5 mg by mouth., Disp: , Rfl:   •  naltrexone-bupropion ER (CONTRAVE) 8-90 MG tablet, Wk 1: 1 tab daily, Wk 2: 1 tab twice a day, Wk 3: 2 tabs in AM, 1 tab in PM, Wk 4: 2 tabs twice a day, Maintenance dose: 2 tabs twice daily., Disp: 70 tablet, Rfl: 1     PMFSH  The following portions of the patient's history were reviewed and updated as appropriate: allergies, current medications, past family history, past medical history, past social history, past surgical history and problem list.    Review of Systems   Constitutional: Positive for fatigue. Negative for fever.   HENT: Negative for mouth sores and trouble swallowing.    Eyes: Negative for pain and visual  disturbance.   Respiratory: Negative for shortness of breath and wheezing.    Cardiovascular: Negative for chest pain and palpitations.   Gastrointestinal: Negative for abdominal pain and blood in stool.   Endocrine: Negative.    Genitourinary: Negative.    Musculoskeletal: Negative for joint swelling.   Skin: Negative.    Allergic/Immunologic: Negative.    Neurological: Negative for seizures and syncope.   Hematological: Negative for adenopathy.   Psychiatric/Behavioral: Positive for decreased concentration and sleep disturbance.       Objective   /86 (BP Location: Right arm, Patient Position: Sitting, Cuff Size: Adult)  Pulse 88  Resp 16  Wt 79.1 kg (174 lb 6.4 oz)  BMI 35.22 kg/m2    Physical Exam   Constitutional: She appears well-developed and well-nourished.   HENT:   Head: Normocephalic.   Right Ear: Hearing, tympanic membrane, external ear and ear canal normal.   Left Ear: Hearing, tympanic membrane, external ear and ear canal normal.   Nose: Nose normal.   Mouth/Throat: Oropharynx is clear and moist.   Eyes: Conjunctivae are normal. Pupils are equal, round, and reactive to light.   Neck: Normal range of motion.   Cardiovascular: Normal rate, regular rhythm and normal heart sounds.    Pulmonary/Chest: Effort normal and breath sounds normal. She has no decreased breath sounds. She has no wheezes. She has no rhonchi. She has no rales.   Musculoskeletal: Normal range of motion.   Neurological: She is alert.   Skin: Skin is warm and dry.   Psychiatric: She has a normal mood and affect. Her behavior is normal.   Nursing note and vitals reviewed.           ASSESSMENT/PLAN    Problem List Items Addressed This Visit        Respiratory    Severe obstructive sleep apnea - Primary     Seen by oral surgeon and fitted for oral appliance- still working on insurance coverage.            Digestive    Obesity (BMI 30-39.9)     Obesity is unchanged.  Discussed the patient's BMI.  The BMI is above average; BMI  management plan is completed.  General weight loss/lifestyle modification strategies discussed (elicit support from others; identify saboteurs; non-food rewards, etc).  Informal exercise measures discussed, e.g. taking stairs instead of elevator.  Regular aerobic exercise program discussed.  Pharmacotherapy as ordered. Retry ordering contrave. If not covered, will try saxenda instead.         Relevant Medications    naltrexone-bupropion ER (CONTRAVE) 8-90 MG tablet               Return in about 2 months (around 3/9/2018) for Recheck.

## 2018-01-10 NOTE — ASSESSMENT & PLAN NOTE
Obesity is unchanged.  Discussed the patient's BMI.  The BMI is above average; BMI management plan is completed.  General weight loss/lifestyle modification strategies discussed (elicit support from others; identify saboteurs; non-food rewards, etc).  Informal exercise measures discussed, e.g. taking stairs instead of elevator.  Regular aerobic exercise program discussed.  Pharmacotherapy as ordered. Retry ordering contrave. If not covered, will try saxenda instead.

## 2018-02-10 RX ORDER — PRAVASTATIN SODIUM 80 MG/1
TABLET ORAL
Qty: 30 TABLET | Refills: 0 | Status: SHIPPED | OUTPATIENT
Start: 2018-02-10 | End: 2018-05-18 | Stop reason: SDUPTHER

## 2018-02-12 ENCOUNTER — TELEPHONE (OUTPATIENT)
Dept: INTERNAL MEDICINE | Facility: CLINIC | Age: 53
End: 2018-02-12

## 2018-02-12 DIAGNOSIS — E66.9 OBESITY (BMI 30-39.9): ICD-10-CM

## 2018-02-12 NOTE — TELEPHONE ENCOUNTER
LOV: 01/09/18  Received fax from [facility] pharmacy requesting a refill on contrave 8-90 with dispense quantity of 120 instead of 70 so that the coupon will cover med, coupon doesn't cover if quantity is less than 120, pls alma delia.

## 2018-02-12 NOTE — TELEPHONE ENCOUNTER
Received call from pharmacy wanting to clarify if pt is doing the titrating prescription again for contrave or if it's the maintenance dose, per pt's chart informed pharmacy that it should have been maintenance dose per pt's chart, pt started titrating dose in January.

## 2018-03-09 ENCOUNTER — OFFICE VISIT (OUTPATIENT)
Dept: INTERNAL MEDICINE | Facility: CLINIC | Age: 53
End: 2018-03-09

## 2018-03-09 VITALS
HEART RATE: 80 BPM | DIASTOLIC BLOOD PRESSURE: 80 MMHG | WEIGHT: 169 LBS | BODY MASS INDEX: 34.13 KG/M2 | OXYGEN SATURATION: 98 % | SYSTOLIC BLOOD PRESSURE: 132 MMHG

## 2018-03-09 DIAGNOSIS — E78.5 HYPERLIPIDEMIA, UNSPECIFIED HYPERLIPIDEMIA TYPE: Primary | ICD-10-CM

## 2018-03-09 DIAGNOSIS — G47.33 SEVERE OBSTRUCTIVE SLEEP APNEA: ICD-10-CM

## 2018-03-09 DIAGNOSIS — E66.9 OBESITY (BMI 30-39.9): ICD-10-CM

## 2018-03-09 LAB
ALBUMIN SERPL-MCNC: 4.7 G/DL (ref 3.2–4.8)
ALBUMIN/GLOB SERPL: 2.1 G/DL (ref 1.5–2.5)
ALP SERPL-CCNC: 109 U/L (ref 25–100)
ALT SERPL W P-5'-P-CCNC: 67 U/L (ref 7–40)
ANION GAP SERPL CALCULATED.3IONS-SCNC: 9 MMOL/L (ref 3–11)
ARTICHOKE IGE QN: 86 MG/DL (ref 0–130)
AST SERPL-CCNC: 37 U/L (ref 0–33)
BILIRUB SERPL-MCNC: 0.5 MG/DL (ref 0.3–1.2)
BUN BLD-MCNC: 15 MG/DL (ref 9–23)
BUN/CREAT SERPL: 18.8 (ref 7–25)
CALCIUM SPEC-SCNC: 9.8 MG/DL (ref 8.7–10.4)
CHLORIDE SERPL-SCNC: 108 MMOL/L (ref 99–109)
CHOLEST SERPL-MCNC: 143 MG/DL (ref 0–200)
CO2 SERPL-SCNC: 26 MMOL/L (ref 20–31)
CREAT BLD-MCNC: 0.8 MG/DL (ref 0.6–1.3)
GFR SERPL CREATININE-BSD FRML MDRD: 75 ML/MIN/1.73
GLOBULIN UR ELPH-MCNC: 2.2 GM/DL
GLUCOSE BLD-MCNC: 112 MG/DL (ref 70–100)
HDLC SERPL-MCNC: 52 MG/DL (ref 40–60)
POTASSIUM BLD-SCNC: 4.5 MMOL/L (ref 3.5–5.5)
PROT SERPL-MCNC: 6.9 G/DL (ref 5.7–8.2)
SODIUM BLD-SCNC: 143 MMOL/L (ref 132–146)
TRIGL SERPL-MCNC: 100 MG/DL (ref 0–150)

## 2018-03-09 PROCEDURE — 80053 COMPREHEN METABOLIC PANEL: CPT | Performed by: PHYSICIAN ASSISTANT

## 2018-03-09 PROCEDURE — 99214 OFFICE O/P EST MOD 30 MIN: CPT | Performed by: PHYSICIAN ASSISTANT

## 2018-03-09 PROCEDURE — 80061 LIPID PANEL: CPT | Performed by: PHYSICIAN ASSISTANT

## 2018-03-09 NOTE — ASSESSMENT & PLAN NOTE
Obesity is improving with treatment.  Discussed the patient's BMI.  The BMI is above average; BMI management plan is completed.  General weight loss/lifestyle modification strategies discussed (elicit support from others; identify saboteurs; non-food rewards, etc).  Diet interventions: increase protein, lower carbs.  Informal exercise measures discussed, e.g. taking stairs instead of elevator.  Regular aerobic exercise program discussed.  Pharmacotherapy as ordered.

## 2018-03-09 NOTE — PROGRESS NOTES
Chief Complaint   Patient presents with   • Sleep Apnea   • Obesity       Subjective   Adriana Diamond is a 52 y.o. female.       History of Present Illness     Pt has started the contrave and is up to taking 2 tablets twice daily. She has not noticed any significant changes in her appetite but may be less inclined to eat without thinking. Not checking her weight at home.     Pt has been fitted for the oral appliance for sleep apnea and is waiting for it to be made. She will go back to the oral surgeon to be shown how to use it. She has been told that insurance will likely cover it. Pt will pay for it if not covered, worth it to help her sleep. Pt is sleeping much better in the last couple weeks.    Fasting today for recheck of cholesterol. Taking current dose of pravastatin without any problem.      Current Outpatient Prescriptions:   •  melatonin 5 MG tablet tablet, Take 5 mg by mouth., Disp: , Rfl:   •  Multiple Vitamins-Minerals (MULTI-VITAMIN GUMMIES) chewable tablet, Chew daily., Disp: , Rfl:   •  naltrexone-bupropion ER (CONTRAVE) 8-90 MG tablet, Wk 1: 1 tab daily, Wk 2: 1 tab twice a day, Wk 3: 2 tabs in AM, 1 tab in PM, Wk 4: 2 tabs twice a day, Maintenance dose: 2 tabs twice daily., Disp: 120 tablet, Rfl: 1  •  Omeprazole-Sodium Bicarbonate (ZEGERID PO), Take  by mouth., Disp: , Rfl:   •  pravastatin (PRAVACHOL) 80 MG tablet, TAKE ONE TABLET BY MOUTH DAILY, Disp: 30 tablet, Rfl: 0     PMFSH  The following portions of the patient's history were reviewed and updated as appropriate: allergies, current medications, past family history, past medical history, past social history, past surgical history and problem list.    Review of Systems   Constitutional: Positive for fatigue. Negative for fever.   HENT: Negative for mouth sores and trouble swallowing.    Eyes: Negative for pain and visual disturbance.   Respiratory: Negative for shortness of breath and wheezing.    Cardiovascular: Negative for chest pain and  palpitations.   Gastrointestinal: Negative for abdominal pain and blood in stool.   Endocrine: Negative.    Genitourinary: Negative.    Musculoskeletal: Positive for neck pain. Negative for joint swelling.   Skin: Negative.    Allergic/Immunologic: Negative.    Neurological: Negative for seizures and syncope.   Hematological: Negative for adenopathy.   Psychiatric/Behavioral: Positive for decreased concentration and sleep disturbance.       Objective   /80  Pulse 80  Wt 76.7 kg (169 lb)  SpO2 98%  BMI 34.13 kg/m2    Physical Exam   Constitutional: She appears well-developed and well-nourished.   HENT:   Head: Normocephalic.   Right Ear: Hearing, tympanic membrane, external ear and ear canal normal.   Left Ear: Hearing, tympanic membrane, external ear and ear canal normal.   Nose: Nose normal.   Mouth/Throat: Oropharynx is clear and moist.   Eyes: Conjunctivae are normal. Pupils are equal, round, and reactive to light.   Neck: Normal range of motion.   Cardiovascular: Normal rate, regular rhythm and normal heart sounds.    Pulmonary/Chest: Effort normal and breath sounds normal. She has no decreased breath sounds. She has no wheezes. She has no rhonchi. She has no rales.   Musculoskeletal: Normal range of motion.   Neurological: She is alert.   Skin: Skin is warm and dry.   Psychiatric: She has a normal mood and affect. Her behavior is normal.   Nursing note and vitals reviewed.      Results for orders placed or performed in visit on 12/12/17   Lipid Panel   Result Value Ref Range    Total Cholesterol 214 (H) 0 - 200 mg/dL    Triglycerides 121 0 - 150 mg/dL    HDL Cholesterol 55 40 - 60 mg/dL    LDL Cholesterol  154 (H) 0 - 130 mg/dL   Comprehensive Metabolic Panel   Result Value Ref Range    Glucose 111 (H) 70 - 100 mg/dL    BUN 19 9 - 23 mg/dL    Creatinine 0.60 0.60 - 1.30 mg/dL    Sodium 139 132 - 146 mmol/L    Potassium 4.5 3.5 - 5.5 mmol/L    Chloride 103 99 - 109 mmol/L    CO2 27.0 20.0 - 31.0 mmol/L     Calcium 9.4 8.7 - 10.4 mg/dL    Total Protein 6.5 5.7 - 8.2 g/dL    Albumin 4.40 3.20 - 4.80 g/dL    ALT (SGPT) 69 (H) 7 - 40 U/L    AST (SGOT) 33 0 - 33 U/L    Alkaline Phosphatase 99 25 - 100 U/L    Total Bilirubin 0.3 0.3 - 1.2 mg/dL    eGFR Non African Amer 105 >60 mL/min/1.73    Globulin 2.1 gm/dL    A/G Ratio 2.1 1.5 - 2.5 g/dL    BUN/Creatinine Ratio 31.7 (H) 7.0 - 25.0    Anion Gap 9.0 3.0 - 11.0 mmol/L        ASSESSMENT/PLAN    Problem List Items Addressed This Visit        Cardiovascular and Mediastinum    Hyperlipidemia - Primary     Check lipid profile today. Further recommendations based on results.         Relevant Orders    Lipid Panel    Comprehensive Metabolic Panel       Respiratory    Severe obstructive sleep apnea     Awaiting oral appliance from oral surgeon. Follow up in 2 months to see how it is working.            Digestive    Obesity (BMI 30-39.9)     Obesity is improving with treatment.  Discussed the patient's BMI.  The BMI is above average; BMI management plan is completed.  General weight loss/lifestyle modification strategies discussed (elicit support from others; identify saboteurs; non-food rewards, etc).  Diet interventions: increase protein, lower carbs.  Informal exercise measures discussed, e.g. taking stairs instead of elevator.  Regular aerobic exercise program discussed.  Pharmacotherapy as ordered.                    Return in about 2 months (around 5/9/2018).

## 2018-05-07 DIAGNOSIS — E66.9 OBESITY (BMI 30-39.9): ICD-10-CM

## 2018-05-07 RX ORDER — NALTREXONE HYDROCHLORIDE AND BUPROPION HYDROCHLORIDE 8; 90 MG/1; MG/1
TABLET, EXTENDED RELEASE ORAL
Qty: 120 TABLET | Refills: 0 | Status: SHIPPED | OUTPATIENT
Start: 2018-05-07 | End: 2018-05-10 | Stop reason: SDUPTHER

## 2018-05-10 DIAGNOSIS — E66.9 OBESITY (BMI 30-39.9): ICD-10-CM

## 2018-05-18 ENCOUNTER — OFFICE VISIT (OUTPATIENT)
Dept: INTERNAL MEDICINE | Facility: CLINIC | Age: 53
End: 2018-05-18

## 2018-05-18 VITALS
OXYGEN SATURATION: 99 % | HEART RATE: 84 BPM | WEIGHT: 164 LBS | SYSTOLIC BLOOD PRESSURE: 134 MMHG | DIASTOLIC BLOOD PRESSURE: 78 MMHG | BODY MASS INDEX: 33.12 KG/M2

## 2018-05-18 DIAGNOSIS — F51.04 PSYCHOPHYSIOLOGICAL INSOMNIA: Primary | ICD-10-CM

## 2018-05-18 DIAGNOSIS — E66.9 OBESITY (BMI 30-39.9): ICD-10-CM

## 2018-05-18 DIAGNOSIS — G47.33 SEVERE OBSTRUCTIVE SLEEP APNEA: ICD-10-CM

## 2018-05-18 DIAGNOSIS — E78.5 HYPERLIPIDEMIA, UNSPECIFIED HYPERLIPIDEMIA TYPE: ICD-10-CM

## 2018-05-18 PROCEDURE — 99214 OFFICE O/P EST MOD 30 MIN: CPT | Performed by: PHYSICIAN ASSISTANT

## 2018-05-18 RX ORDER — PRAVASTATIN SODIUM 80 MG/1
80 TABLET ORAL DAILY
Qty: 90 TABLET | Refills: 3 | Status: SHIPPED | OUTPATIENT
Start: 2018-05-18 | End: 2019-05-17 | Stop reason: SDUPTHER

## 2018-05-18 NOTE — PROGRESS NOTES
Chief Complaint   Patient presents with   • Sleep Apnea     follow up       Subjective   Adriana Diamond is a 52 y.o. female.       History of Present Illness     Pt is still doing well with the contrave, she is tolerating it well. Continues to feel like it is helping her have lower portions.    She is using her oral appliance for sleep apnea and is not snoring. Still sometimes struggles to go to sleep. She was previously able to sleep with the Ambien she took previously. No Ambien has been prescribed recently d/t her severe sleep apnea. Does not currently have an appointment.    Pt ran out of pravastatin and stopped taking it.      Current Outpatient Prescriptions:   •  melatonin 5 MG tablet tablet, Take 5 mg by mouth., Disp: , Rfl:   •  Multiple Vitamins-Minerals (MULTI-VITAMIN GUMMIES) chewable tablet, Chew daily., Disp: , Rfl:   •  naltrexone-bupropion ER (CONTRAVE) 8-90 MG tablet, Take 2 tablets by mouth 2 (Two) Times a Day., Disp: 120 tablet, Rfl: 2  •  Omeprazole-Sodium Bicarbonate (ZEGERID PO), Take  by mouth., Disp: , Rfl:   •  pravastatin (PRAVACHOL) 80 MG tablet, Take 1 tablet by mouth Daily., Disp: 90 tablet, Rfl: 3     PMFSH  The following portions of the patient's history were reviewed and updated as appropriate: allergies, current medications, past family history, past medical history, past social history, past surgical history and problem list.    Review of Systems   Constitutional: Positive for fatigue. Negative for fever.   HENT: Negative for mouth sores and trouble swallowing.    Eyes: Negative for pain and visual disturbance.   Respiratory: Negative for shortness of breath and wheezing.    Cardiovascular: Negative for chest pain and palpitations.   Gastrointestinal: Negative for abdominal pain and blood in stool.   Endocrine: Negative.    Genitourinary: Negative.    Musculoskeletal: Negative for joint swelling.   Skin: Negative.    Allergic/Immunologic: Negative.    Neurological: Negative for  seizures and syncope.   Hematological: Negative for adenopathy.   Psychiatric/Behavioral: Positive for decreased concentration and sleep disturbance.       Objective   /78   Pulse 84   Wt 74.4 kg (164 lb)   SpO2 99%   BMI 33.12 kg/m²     Physical Exam   Constitutional: She appears well-developed and well-nourished.   HENT:   Head: Normocephalic.   Right Ear: Hearing, tympanic membrane, external ear and ear canal normal.   Left Ear: Hearing, tympanic membrane, external ear and ear canal normal.   Nose: Nose normal.   Mouth/Throat: Oropharynx is clear and moist.   Eyes: Conjunctivae are normal. Pupils are equal, round, and reactive to light.   Neck: Normal range of motion.   Cardiovascular: Normal rate, regular rhythm and normal heart sounds.    Pulmonary/Chest: Effort normal and breath sounds normal. She has no decreased breath sounds. She has no wheezes. She has no rhonchi. She has no rales.   Musculoskeletal: Normal range of motion.   Neurological: She is alert.   Skin: Skin is warm and dry.   Psychiatric: She has a normal mood and affect. Her behavior is normal.   Nursing note and vitals reviewed.      Results for orders placed or performed in visit on 03/09/18   Lipid Panel   Result Value Ref Range    Total Cholesterol 143 0 - 200 mg/dL    Triglycerides 100 0 - 150 mg/dL    HDL Cholesterol 52 40 - 60 mg/dL    LDL Cholesterol  86 0 - 130 mg/dL   Comprehensive Metabolic Panel   Result Value Ref Range    Glucose 112 (H) 70 - 100 mg/dL    BUN 15 9 - 23 mg/dL    Creatinine 0.80 0.60 - 1.30 mg/dL    Sodium 143 132 - 146 mmol/L    Potassium 4.5 3.5 - 5.5 mmol/L    Chloride 108 99 - 109 mmol/L    CO2 26.0 20.0 - 31.0 mmol/L    Calcium 9.8 8.7 - 10.4 mg/dL    Total Protein 6.9 5.7 - 8.2 g/dL    Albumin 4.70 3.20 - 4.80 g/dL    ALT (SGPT) 67 (H) 7 - 40 U/L    AST (SGOT) 37 (H) 0 - 33 U/L    Alkaline Phosphatase 109 (H) 25 - 100 U/L    Total Bilirubin 0.5 0.3 - 1.2 mg/dL    eGFR Non African Amer 75 >60 mL/min/1.73     Globulin 2.2 gm/dL    A/G Ratio 2.1 1.5 - 2.5 g/dL    BUN/Creatinine Ratio 18.8 7.0 - 25.0    Anion Gap 9.0 3.0 - 11.0 mmol/L        ASSESSMENT/PLAN    Problem List Items Addressed This Visit        Cardiovascular and Mediastinum    Hyperlipidemia     Lipid abnormalities are improving with treatment.  Pharmacotherapy as ordered.  Lipids will be reassessed in 3 months.         Relevant Medications    pravastatin (PRAVACHOL) 80 MG tablet       Respiratory    Severe obstructive sleep apnea     Symptomatically improved. She will schedule follow up with sleep clinic. Will discuss refill of ambien 5 mg with on-call provider.            Digestive    Obesity (BMI 30-39.9)     Obesity is improving with treatment.  Discussed the patient's BMI.  The BMI is above average; BMI management plan is completed.  General weight loss/lifestyle modification strategies discussed (elicit support from others; identify saboteurs; non-food rewards, etc).  Informal exercise measures discussed, e.g. taking stairs instead of elevator.  Regular aerobic exercise program discussed.  Pharmacotherapy as ordered.         Relevant Medications    naltrexone-bupropion ER (CONTRAVE) 8-90 MG tablet       Other    Psychophysiological insomnia - Primary     Will discuss refill of Ambien 5 mg qhs with on-call provider.                    Return in about 3 months (around 8/18/2018) for Recheck.

## 2018-05-20 NOTE — ASSESSMENT & PLAN NOTE
Symptomatically improved. She will schedule follow up with sleep clinic. Will discuss refill of ambien 5 mg with on-call provider.

## 2018-05-20 NOTE — ASSESSMENT & PLAN NOTE
Lipid abnormalities are improving with treatment.  Pharmacotherapy as ordered.  Lipids will be reassessed in 3 months.

## 2018-05-20 NOTE — ASSESSMENT & PLAN NOTE
Obesity is improving with treatment.  Discussed the patient's BMI.  The BMI is above average; BMI management plan is completed.  General weight loss/lifestyle modification strategies discussed (elicit support from others; identify saboteurs; non-food rewards, etc).  Informal exercise measures discussed, e.g. taking stairs instead of elevator.  Regular aerobic exercise program discussed.  Pharmacotherapy as ordered.

## 2018-05-21 ENCOUNTER — TELEPHONE (OUTPATIENT)
Dept: INTERNAL MEDICINE | Facility: CLINIC | Age: 53
End: 2018-05-21

## 2018-05-21 NOTE — TELEPHONE ENCOUNTER
PATIENT SAW KAZ ON Friday AND KAZ WAS GOING TO GET ONE OF THE DOCTORS TO WRITE A SCRIPT FOR AMBIEN. PATIENT HAS NOT RECEIVED A CALL ABOUT THE SCRIPT AND PHARMACY HAS NOT BEEN GIVEN A SCRIPT FOR AMBIEN. PATIENT WOULD LIKE A FOLLOW UP CALL ABOUT AMBIEN PRESCRIPTION. YOU CAN REACH PATIENT BACK [phone]

## 2018-05-21 NOTE — TELEPHONE ENCOUNTER
Per Judith called in script into [facility] pharmacy, voided paper script and placed it for scanning, called and notified pt, pt voiced understanding had no further questions or concerns.

## 2018-08-16 ENCOUNTER — APPOINTMENT (OUTPATIENT)
Dept: GENERAL RADIOLOGY | Facility: HOSPITAL | Age: 53
End: 2018-08-16

## 2018-08-16 ENCOUNTER — HOSPITAL ENCOUNTER (EMERGENCY)
Facility: HOSPITAL | Age: 53
Discharge: HOME | End: 2018-08-16
Attending: EMERGENCY MEDICINE | Admitting: EMERGENCY MEDICINE

## 2018-08-16 VITALS
HEART RATE: 81 BPM | RESPIRATION RATE: 18 BRPM | SYSTOLIC BLOOD PRESSURE: 138 MMHG | OXYGEN SATURATION: 98 % | WEIGHT: 157 LBS | DIASTOLIC BLOOD PRESSURE: 79 MMHG | TEMPERATURE: 98.4 F | BODY MASS INDEX: 31.65 KG/M2 | HEIGHT: 59 IN

## 2018-08-16 DIAGNOSIS — S82.61XA CLOSED FRACTURE OF DISTAL LATERAL MALLEOLUS OF RIGHT FIBULA, INITIAL ENCOUNTER: ICD-10-CM

## 2018-08-16 DIAGNOSIS — S42.212A CLOSED DISPLACED FRACTURE OF SURGICAL NECK OF LEFT HUMERUS, UNSPECIFIED FRACTURE MORPHOLOGY, INITIAL ENCOUNTER: Primary | ICD-10-CM

## 2018-08-16 PROCEDURE — 73610 X-RAY EXAM OF ANKLE: CPT

## 2018-08-16 PROCEDURE — 99284 EMERGENCY DEPT VISIT MOD MDM: CPT

## 2018-08-16 RX ORDER — ONDANSETRON 4 MG/1
8 TABLET, FILM COATED ORAL ONCE
Status: COMPLETED | OUTPATIENT
Start: 2018-08-16 | End: 2018-08-16

## 2018-08-16 RX ORDER — OXYCODONE HYDROCHLORIDE AND ACETAMINOPHEN 5; 325 MG/1; MG/1
1 TABLET ORAL EVERY 6 HOURS PRN
Qty: 12 TABLET | Refills: 0 | Status: SHIPPED | OUTPATIENT
Start: 2018-08-16 | End: 2019-03-20

## 2018-08-16 RX ORDER — OXYCODONE HYDROCHLORIDE AND ACETAMINOPHEN 5; 325 MG/1; MG/1
1 TABLET ORAL ONCE
Status: COMPLETED | OUTPATIENT
Start: 2018-08-16 | End: 2018-08-16

## 2018-08-16 RX ORDER — ONDANSETRON 4 MG/1
4 TABLET, FILM COATED ORAL EVERY 6 HOURS PRN
Qty: 12 TABLET | Refills: 0 | Status: SHIPPED | OUTPATIENT
Start: 2018-08-16 | End: 2021-06-22

## 2018-08-16 RX ADMIN — ONDANSETRON 8 MG: 4 TABLET, FILM COATED ORAL at 18:04

## 2018-08-16 RX ADMIN — OXYCODONE HYDROCHLORIDE AND ACETAMINOPHEN 1 TABLET: 5; 325 TABLET ORAL at 18:04

## 2018-08-16 NOTE — DISCHARGE INSTRUCTIONS
Weight bearing as tolerated    Stop taking the Contrave while taking the oxycodone    You may remove the sling to change clothes or bathe, but keep your arm by your side    Follow up with Dr. Trevino at the next available time. Call and make an appointment    Immediately return to the emergency department for any new or worsening symptoms.

## 2018-08-16 NOTE — ED PROVIDER NOTES
Subjective   Adriana Diamond is a 52 y.o. female who presents to the ED with c/o a shoulder injury. The patient was sent to the ED by Parvin Potter after the x-ray showed a left humeral head fracture following a fall today. She states that she fell after slipping on a wet tile at work. There was no loss of consciousness. She also complains of right ankle pain but notes that she is still capable of walking without difficulty. There are no other acute complaints at this time.        History provided by:  Patient  Shoulder Problem   Location:  Shoulder  Shoulder location:  L shoulder  Injury: yes    Mechanism of injury: fall    Fall:     Fall occurred:  Walking    Impact surface: tile.    Point of impact: left side.  Pain details:     Quality:  Unable to specify    Radiates to:  L shoulder    Severity:  Unable to specify    Onset quality:  Sudden    Timing:  Constant    Progression:  Unchanged  Associated symptoms: no fever        Review of Systems   Constitutional: Negative for chills and fever.   Musculoskeletal:        Left shoulder and right ankle pain   Neurological: Negative for syncope.       Past Medical History:   Diagnosis Date   • Acute sinusitis    • Allergic reaction    • Arthritis    • Cheilosis    • Contact dermatitis    • Encounter for preventive health examination    • Herpes simplex type 1 infection     Take valtrex as directed   • Onychomycosis     Trial of lmisil ointment as perscribed. She will try for 3-4 weeks, if no improvement can try oral meds   • Oral thrush    • Stomatitis    • Unintended weight gain     check tsh       Allergies   Allergen Reactions   • Bactrim [Sulfamethoxazole-Trimethoprim]        Past Surgical History:   Procedure Laterality Date   • BREAST BIOPSY Left 2008    stereo       Family History   Problem Relation Age of Onset   • Hypertension Mother    • Diabetes Father    • Breast cancer Neg Hx    • Ovarian cancer Neg Hx        Social History     Social History   • Marital  status:      Social History Main Topics   • Smoking status: Never Smoker   • Smokeless tobacco: Never Used   • Alcohol use Yes      Comment: social   • Drug use: No     Other Topics Concern   • Not on file         Objective   Physical Exam   Constitutional: She is oriented to person, place, and time. She appears well-developed and well-nourished. No distress.   HENT:   Head: Normocephalic and atraumatic.   Nose: Nose normal.   Airway patent.   Eyes: Conjunctivae are normal. No scleral icterus.   Neck: Normal range of motion and phonation normal. Neck supple.   Cardiovascular:   Strong left radial pulse   Pulmonary/Chest: Effort normal. No respiratory distress. She exhibits no tenderness.   Abdominal: Soft. There is no tenderness.   Musculoskeletal:   No back spine or muscle tenderness to palpation.    Neurological: She is alert and oriented to person, place, and time.   Medial, radial, and ulnar nerve function is intact in the LUE   Skin: Skin is warm and dry.   Psychiatric: She has a normal mood and affect. Her behavior is normal.   Nursing note and vitals reviewed.      Procedures         ED Course  ED Course as of Aug 17 0208   Thu Aug 16, 2018   1829 ROVERTO query complete. Treatment plan to include limited course of prescribed  controlled substance. Risks including addiction, benefits, and alternatives presented to patient.     [DT]      ED Course User Index  [DT] Amandeep Chavez     No results found for this or any previous visit (from the past 24 hour(s)).  Note: In addition to lab results from this visit, the labs listed above may include labs taken at another facility or during a different encounter within the last 24 hours. Please correlate lab times with ED admission and discharge times for further clarification of the services performed during this visit.    XR Ankle 3+ View Right   Preliminary Result   Avulsion fracture lateral malleolus.        DICTATED:   8/16/2018   EDITED/ls :   8/16/2018          "    Vitals:    08/16/18 1645   BP: 140/64   BP Location: Right arm   Patient Position: Sitting   Pulse: 82   Resp: 18   Temp: 98.7 °F (37.1 °C)   TempSrc: Oral   SpO2: 97%   Weight: 71.2 kg (157 lb)   Height: 149.9 cm (59\")     Medications   oxyCODONE-acetaminophen (PERCOCET) 5-325 MG per tablet 1 tablet (1 tablet Oral Given 8/16/18 1804)   ondansetron (ZOFRAN) tablet 8 mg (8 mg Oral Given 8/16/18 1804)     ECG/EMG Results (last 24 hours)     ** No results found for the last 24 hours. **                        MDM    Final diagnoses:   Closed displaced fracture of surgical neck of left humerus, unspecified fracture morphology, initial encounter   Closed fracture of distal lateral malleolus of right fibula, initial encounter       Documentation assistance provided by steph Chavez.  Information recorded by the scribe was done at my direction and has been verified and validated by me.     Amandeep Chavez  08/16/18 6310       Amandeep Chavez  08/16/18 2351       Ilya Mitchell MD  08/17/18 0202    "

## 2018-08-21 ENCOUNTER — TRANSCRIBE ORDERS (OUTPATIENT)
Dept: ADMINISTRATIVE | Facility: HOSPITAL | Age: 53
End: 2018-08-21

## 2018-08-21 ENCOUNTER — TRANSCRIBE ORDERS (OUTPATIENT)
Dept: LAB | Facility: HOSPITAL | Age: 53
End: 2018-08-21

## 2018-08-21 ENCOUNTER — APPOINTMENT (OUTPATIENT)
Dept: LAB | Facility: HOSPITAL | Age: 53
End: 2018-08-21

## 2018-08-21 ENCOUNTER — HOSPITAL ENCOUNTER (OUTPATIENT)
Dept: GENERAL RADIOLOGY | Facility: HOSPITAL | Age: 53
Discharge: HOME | End: 2018-08-21
Attending: ORTHOPAEDIC SURGERY | Admitting: ORTHOPAEDIC SURGERY

## 2018-08-21 DIAGNOSIS — R73.09 OTHER ABNORMAL GLUCOSE: ICD-10-CM

## 2018-08-21 DIAGNOSIS — Z87.68 PERSONAL HISTORY OF PERINATAL PROBLEMS: ICD-10-CM

## 2018-08-21 DIAGNOSIS — Z01.818 PRE-OP EXAMINATION: Primary | ICD-10-CM

## 2018-08-21 DIAGNOSIS — Z01.818 PREOP EXAMINATION: Primary | ICD-10-CM

## 2018-08-21 LAB
ANION GAP SERPL CALCULATED.3IONS-SCNC: 7 MMOL/L (ref 3–11)
BUN BLD-MCNC: 18 MG/DL (ref 9–23)
BUN/CREAT SERPL: 23.4 (ref 7–25)
CALCIUM SPEC-SCNC: 9.2 MG/DL (ref 8.7–10.4)
CHLORIDE SERPL-SCNC: 104 MMOL/L (ref 99–109)
CO2 SERPL-SCNC: 31 MMOL/L (ref 20–31)
CREAT BLD-MCNC: 0.77 MG/DL (ref 0.6–1.3)
DEPRECATED RDW RBC AUTO: 48.7 FL (ref 37–54)
ERYTHROCYTE [DISTWIDTH] IN BLOOD BY AUTOMATED COUNT: 14.1 % (ref 11.3–14.5)
GFR SERPL CREATININE-BSD FRML MDRD: 78 ML/MIN/1.73
GLUCOSE BLD-MCNC: 104 MG/DL (ref 70–100)
HBA1C MFR BLD: 5.3 % (ref 4.8–5.6)
HCT VFR BLD AUTO: 38.5 % (ref 34.5–44)
HGB BLD-MCNC: 12.3 G/DL (ref 11.5–15.5)
MCH RBC QN AUTO: 30.2 PG (ref 27–31)
MCHC RBC AUTO-ENTMCNC: 31.9 G/DL (ref 32–36)
MCV RBC AUTO: 94.6 FL (ref 80–99)
PLATELET # BLD AUTO: 217 10*3/MM3 (ref 150–450)
PMV BLD AUTO: 11.6 FL (ref 6–12)
POTASSIUM BLD-SCNC: 4.4 MMOL/L (ref 3.5–5.5)
RBC # BLD AUTO: 4.07 10*6/MM3 (ref 3.89–5.14)
SODIUM BLD-SCNC: 142 MMOL/L (ref 132–146)
WBC NRBC COR # BLD: 8.72 10*3/MM3 (ref 3.5–10.8)

## 2018-08-21 PROCEDURE — 36415 COLL VENOUS BLD VENIPUNCTURE: CPT | Performed by: ORTHOPAEDIC SURGERY

## 2018-08-21 PROCEDURE — 85027 COMPLETE CBC AUTOMATED: CPT | Performed by: ORTHOPAEDIC SURGERY

## 2018-08-21 PROCEDURE — 83036 HEMOGLOBIN GLYCOSYLATED A1C: CPT | Performed by: ORTHOPAEDIC SURGERY

## 2018-08-21 PROCEDURE — 71046 X-RAY EXAM CHEST 2 VIEWS: CPT

## 2018-08-21 PROCEDURE — 80048 BASIC METABOLIC PNL TOTAL CA: CPT | Performed by: ORTHOPAEDIC SURGERY

## 2018-08-21 PROCEDURE — 93005 ELECTROCARDIOGRAM TRACING: CPT | Performed by: ORTHOPAEDIC SURGERY

## 2018-08-21 PROCEDURE — 93010 ELECTROCARDIOGRAM REPORT: CPT | Performed by: INTERNAL MEDICINE

## 2018-10-09 ENCOUNTER — OFFICE VISIT (OUTPATIENT)
Dept: INTERNAL MEDICINE | Facility: CLINIC | Age: 53
End: 2018-10-09

## 2018-10-09 VITALS
WEIGHT: 159 LBS | OXYGEN SATURATION: 98 % | HEART RATE: 74 BPM | DIASTOLIC BLOOD PRESSURE: 78 MMHG | HEIGHT: 59 IN | SYSTOLIC BLOOD PRESSURE: 120 MMHG | BODY MASS INDEX: 32.05 KG/M2

## 2018-10-09 DIAGNOSIS — I10 ESSENTIAL HYPERTENSION: ICD-10-CM

## 2018-10-09 DIAGNOSIS — E66.9 OBESITY (BMI 30-39.9): ICD-10-CM

## 2018-10-09 DIAGNOSIS — E78.5 HYPERLIPIDEMIA, UNSPECIFIED HYPERLIPIDEMIA TYPE: Primary | ICD-10-CM

## 2018-10-09 DIAGNOSIS — F51.04 PSYCHOPHYSIOLOGICAL INSOMNIA: ICD-10-CM

## 2018-10-09 PROCEDURE — 90471 IMMUNIZATION ADMIN: CPT | Performed by: PHYSICIAN ASSISTANT

## 2018-10-09 PROCEDURE — 90674 CCIIV4 VAC NO PRSV 0.5 ML IM: CPT | Performed by: PHYSICIAN ASSISTANT

## 2018-10-09 PROCEDURE — 99214 OFFICE O/P EST MOD 30 MIN: CPT | Performed by: PHYSICIAN ASSISTANT

## 2018-10-09 NOTE — PROGRESS NOTES
Chief Complaint   Patient presents with   • Hyperlipidemia   • Lab work     follow up, labs for health insurance       Subjective   Adriana Diamond is a 53 y.o. female.       History of Present Illness     Pt fell almost 2 months ago and broke her humerus. Had surgery by Dr Trevino on 8/24. Still doing physical therapy and off work. Will be returning to work this week.    Pt is sleeping better since she is now able to sleep lying down. She has only been using the ambien prn to help her sleep.    Needs copy of hgba1 and lipid profile that has been done this year- needs it for insurance.    Doing well with Contrave, has been taking it regularly, seeing slow results.      Current Outpatient Prescriptions:   •  melatonin 5 MG tablet tablet, Take 5 mg by mouth., Disp: , Rfl:   •  Multiple Vitamins-Minerals (MULTI-VITAMIN GUMMIES) chewable tablet, Chew daily., Disp: , Rfl:   •  naltrexone-bupropion ER (CONTRAVE) 8-90 MG tablet, Take 2 tablets by mouth 2 (Two) Times a Day., Disp: 120 tablet, Rfl: 2  •  Omeprazole-Sodium Bicarbonate (ZEGERID PO), Take  by mouth., Disp: , Rfl:   •  ondansetron (ZOFRAN) 4 MG tablet, Take 1 tablet by mouth Every 6 (Six) Hours As Needed for Nausea or Vomiting., Disp: 12 tablet, Rfl: 0  •  oxyCODONE-acetaminophen (PERCOCET) 5-325 MG per tablet, Take 1 tablet by mouth Every 6 (Six) Hours As Needed for Severe Pain ., Disp: 12 tablet, Rfl: 0  •  pravastatin (PRAVACHOL) 80 MG tablet, Take 1 tablet by mouth Daily., Disp: 90 tablet, Rfl: 3  •  Zolpidem Tartrate (AMBIEN PO), Take  by mouth., Disp: , Rfl:      PMFSH  The following portions of the patient's history were reviewed and updated as appropriate: allergies, current medications, past family history, past medical history, past social history, past surgical history and problem list.    Review of Systems   Constitutional: Positive for fatigue. Negative for fever.   HENT: Negative for mouth sores and trouble swallowing.    Eyes: Negative for pain and  "visual disturbance.   Respiratory: Negative for shortness of breath and wheezing.    Cardiovascular: Negative for chest pain and palpitations.   Gastrointestinal: Negative for abdominal pain and blood in stool.   Endocrine: Negative.    Genitourinary: Negative.    Musculoskeletal: Positive for arthralgias. Negative for joint swelling.   Skin: Negative.    Allergic/Immunologic: Negative.    Neurological: Negative for seizures and syncope.   Hematological: Negative for adenopathy.   Psychiatric/Behavioral: Positive for decreased concentration and sleep disturbance.       Objective   /78   Pulse 74   Ht 149.9 cm (59\")   Wt 72.1 kg (159 lb)   SpO2 98%   BMI 32.11 kg/m²     Physical Exam   Constitutional: She appears well-developed and well-nourished.   HENT:   Head: Normocephalic.   Right Ear: Hearing, tympanic membrane, external ear and ear canal normal.   Left Ear: Hearing, tympanic membrane, external ear and ear canal normal.   Nose: Nose normal.   Mouth/Throat: Oropharynx is clear and moist.   Eyes: Pupils are equal, round, and reactive to light. Conjunctivae are normal.   Neck: Normal range of motion.   Cardiovascular: Normal rate, regular rhythm and normal heart sounds.    Pulmonary/Chest: Effort normal and breath sounds normal. She has no decreased breath sounds. She has no wheezes. She has no rhonchi. She has no rales.   Musculoskeletal: Normal range of motion.   Neurological: She is alert.   Skin: Skin is warm and dry.   Psychiatric: She has a normal mood and affect. Her behavior is normal.   Nursing note and vitals reviewed.      ASSESSMENT/PLAN    Problem List Items Addressed This Visit        Cardiovascular and Mediastinum    Essential hypertension     Hypertension is unchanged.  Continue current treatment regimen.  Dietary sodium restriction.  Weight loss.  Regular aerobic exercise.  Continue current medications.  Blood pressure will be reassessed at the next regular appointment.         " Hyperlipidemia - Primary     Lipid abnormalities are unchanged.  Continue current medications.  Lipids will be reassessed in 6 months.            Digestive    Obesity (BMI 30-39.9)     Obesity is improving with treatment.  Discussed the patient's BMI.  The BMI is above average; BMI management plan is completed.  General weight loss/lifestyle modification strategies discussed (elicit support from others; identify saboteurs; non-food rewards, etc).  Informal exercise measures discussed, e.g. taking stairs instead of elevator.  Regular aerobic exercise program discussed.  Continue contrave.            Other    Psychophysiological insomnia     Using ambien prn, continue current dosing.                    Return in about 3 months (around 1/9/2019) for Recheck.

## 2018-10-10 NOTE — ASSESSMENT & PLAN NOTE
Obesity is improving with treatment.  Discussed the patient's BMI.  The BMI is above average; BMI management plan is completed.  General weight loss/lifestyle modification strategies discussed (elicit support from others; identify saboteurs; non-food rewards, etc).  Informal exercise measures discussed, e.g. taking stairs instead of elevator.  Regular aerobic exercise program discussed.  Continue contrave.

## 2018-10-10 NOTE — ASSESSMENT & PLAN NOTE
Lipid abnormalities are unchanged.  Continue current medications.  Lipids will be reassessed in 6 months.

## 2019-01-14 ENCOUNTER — TELEPHONE (OUTPATIENT)
Dept: INTERNAL MEDICINE | Facility: CLINIC | Age: 54
End: 2019-01-14

## 2019-01-14 NOTE — TELEPHONE ENCOUNTER
Patient called today regarding a billing statement for $25 for the flu vaccine. This should be covered under her health insurance. She received the vaccine on 10/9/2018. Is there any reason this would not have been covered?    Call back [phone]

## 2019-01-14 NOTE — TELEPHONE ENCOUNTER
Called pt and gave her the phone number to billing department to see why she got billed for the flu shot.

## 2019-03-20 ENCOUNTER — OFFICE VISIT (OUTPATIENT)
Dept: INTERNAL MEDICINE | Facility: CLINIC | Age: 54
End: 2019-03-20

## 2019-03-20 VITALS
BODY MASS INDEX: 34.07 KG/M2 | OXYGEN SATURATION: 96 % | SYSTOLIC BLOOD PRESSURE: 120 MMHG | DIASTOLIC BLOOD PRESSURE: 70 MMHG | HEIGHT: 59 IN | HEART RATE: 90 BPM | WEIGHT: 169 LBS | TEMPERATURE: 98 F

## 2019-03-20 DIAGNOSIS — K13.70 ORAL LESION: Primary | ICD-10-CM

## 2019-03-20 PROCEDURE — 99213 OFFICE O/P EST LOW 20 MIN: CPT | Performed by: NURSE PRACTITIONER

## 2019-03-20 RX ORDER — VALACYCLOVIR HYDROCHLORIDE 1 G/1
1000 TABLET, FILM COATED ORAL DAILY
Qty: 10 TABLET | Refills: 2 | Status: SHIPPED | OUTPATIENT
Start: 2019-03-20 | End: 2019-05-17 | Stop reason: SDUPTHER

## 2019-03-20 NOTE — PROGRESS NOTES
Chief Complaint   Patient presents with   • Blister     vidyauge had a spot       History of Present Illness    Adriana Diamond is a 53 y.o. female who presents today for sore on right lateral tongue x multiple days. Denies fevers, chills, NVD, recent URI, recent stress, aggravating foods, external perioral lesions. Patient states she had these numerous times in the past and was treated with Valtrex which seems to help. She is out of prescription currently. Patient states blister has improved somewhat since initial onset, however she has had multiple blisters break out on tongue concurrently in the past. Thinks she may have had a previous diagnosis of oral herpes causing recurrence of symptoms.     [facility]    The following portions of the patient's history were reviewed and updated as appropriate: allergies, current medications, past family history, past medical history, past social history, past surgical history and problem list.     Past Medical History:   Diagnosis Date   • Acute sinusitis    • Allergic reaction    • Arthritis    • Cheilosis    • Contact dermatitis    • Encounter for preventive health examination    • Herpes simplex type 1 infection     Take valtrex as directed   • Onychomycosis     Trial of lmisil ointment as perscribed. She will try for 3-4 weeks, if no improvement can try oral meds   • Oral thrush    • Stomatitis    • Unintended weight gain     check tsh       Past Surgical History:   Procedure Laterality Date   • BREAST BIOPSY Left 2008    stereo       Allergies   Allergen Reactions   • Bactrim [Sulfamethoxazole-Trimethoprim]          Current Outpatient Medications:   •  melatonin 5 MG tablet tablet, Take 5 mg by mouth., Disp: , Rfl:   •  Multiple Vitamins-Minerals (MULTI-VITAMIN GUMMIES) chewable tablet, Chew daily., Disp: , Rfl:   •  Omeprazole-Sodium Bicarbonate (ZEGERID PO), Take  by mouth., Disp: , Rfl:   •  ondansetron (ZOFRAN) 4 MG tablet, Take 1 tablet by mouth Every 6 (Six) Hours As  "Needed for Nausea or Vomiting., Disp: 12 tablet, Rfl: 0  •  pravastatin (PRAVACHOL) 80 MG tablet, Take 1 tablet by mouth Daily., Disp: 90 tablet, Rfl: 3  •  Zolpidem Tartrate (AMBIEN PO), Take  by mouth., Disp: , Rfl:   •  benzocaine (ORAJEL) 10 % mucosal gel, Apply to oral lesion QID prn, Disp: 1 tube, Rfl: 0  •  valACYclovir (VALTREX) 1000 MG tablet, Take 1 tablet by mouth Daily. Take 1 tablet by mouth 1 (One) Time for 1 dose. Take at onset of symptoms, repeat as needed., Disp: 10 tablet, Rfl: 2    Review of Systems  Review of Systems   Constitutional: Negative for chills, fatigue and fever.   HENT: Positive for mouth sores. Negative for congestion, ear pain, rhinorrhea and sore throat.    Respiratory: Negative for cough.    Cardiovascular: Negative for chest pain and palpitations.   Neurological: Negative for headaches.   Psychiatric/Behavioral: Negative for sleep disturbance. The patient is not nervous/anxious.        Vitals:  Vitals:    03/20/19 1713   BP: 120/70   Pulse: 90   Temp: 98 °F (36.7 °C)   SpO2: 96%   Weight: 76.7 kg (169 lb)   Height: 149.9 cm (59\")   PainSc:   2       Physical Exam  Physical Exam   Constitutional: She is oriented to person, place, and time. She appears well-developed and well-nourished.   HENT:   Mouth/Throat: Uvula is midline, oropharynx is clear and moist and mucous membranes are normal. Oral lesions present.       Cardiovascular: Normal rate, regular rhythm and normal heart sounds.   Pulmonary/Chest: Effort normal and breath sounds normal.   Neurological: She is alert and oriented to person, place, and time.   Psychiatric: She has a normal mood and affect. Her behavior is normal.   Nursing note and vitals reviewed.      Labs  None this viist    Assessment/Plan  Adriana was seen today for blister.    Diagnoses and all orders for this visit:    Oral lesion  -     valACYclovir (VALTREX) 1000 MG tablet; Take 1 tablet by mouth Daily. Take 1 tablet by mouth 1 (One) Time for 1 dose. " Take at onset of symptoms, repeat as needed.    Other orders  -     benzocaine (ORAJEL) 10 % mucosal gel; Apply to oral lesion QID prn    Encouraged increased fluid intake and avoidance of acidic or high salt foods that may cause more discomfort and aggravation of symptoms.    Plan of care reviewed with patient at conclusion of today's visit. Patient education was provided regarding diagnosis, management, and prescribed or recommended OTC medications. Patient was informed to notify office of any new, worsening, or persistent symptoms. Patient verbalized understanding and agreement with plan of care.     Follow-Up  Return if symptoms worsen or fail to improve.    Electronically Signed By:  MALAIKA Vázquez

## 2019-05-17 ENCOUNTER — OFFICE VISIT (OUTPATIENT)
Dept: INTERNAL MEDICINE | Facility: CLINIC | Age: 54
End: 2019-05-17

## 2019-05-17 VITALS
TEMPERATURE: 98.1 F | WEIGHT: 171 LBS | OXYGEN SATURATION: 98 % | DIASTOLIC BLOOD PRESSURE: 64 MMHG | HEIGHT: 59 IN | SYSTOLIC BLOOD PRESSURE: 118 MMHG | BODY MASS INDEX: 34.47 KG/M2 | HEART RATE: 76 BPM

## 2019-05-17 DIAGNOSIS — Z11.59 NEED FOR HEPATITIS C SCREENING TEST: ICD-10-CM

## 2019-05-17 DIAGNOSIS — B00.1 RECURRENT COLD SORES: Primary | ICD-10-CM

## 2019-05-17 DIAGNOSIS — E78.5 HYPERLIPIDEMIA, UNSPECIFIED HYPERLIPIDEMIA TYPE: ICD-10-CM

## 2019-05-17 DIAGNOSIS — K13.70 ORAL LESION: ICD-10-CM

## 2019-05-17 LAB
ALBUMIN SERPL-MCNC: 4.3 G/DL (ref 3.5–5.2)
ALBUMIN/GLOB SERPL: 1.5 G/DL
ALP SERPL-CCNC: 107 U/L (ref 39–117)
ALT SERPL W P-5'-P-CCNC: 60 U/L (ref 1–33)
ANION GAP SERPL CALCULATED.3IONS-SCNC: 13.8 MMOL/L
AST SERPL-CCNC: 35 U/L (ref 1–32)
BILIRUB SERPL-MCNC: 0.4 MG/DL (ref 0.2–1.2)
BUN BLD-MCNC: 11 MG/DL (ref 6–20)
BUN/CREAT SERPL: 16.4 (ref 7–25)
CALCIUM SPEC-SCNC: 10 MG/DL (ref 8.6–10.5)
CHLORIDE SERPL-SCNC: 105 MMOL/L (ref 98–107)
CHOLEST SERPL-MCNC: 147 MG/DL (ref 0–200)
CO2 SERPL-SCNC: 25.2 MMOL/L (ref 22–29)
CREAT BLD-MCNC: 0.67 MG/DL (ref 0.57–1)
GFR SERPL CREATININE-BSD FRML MDRD: 92 ML/MIN/1.73
GLOBULIN UR ELPH-MCNC: 2.8 GM/DL
GLUCOSE BLD-MCNC: 117 MG/DL (ref 65–99)
HCV AB SER DONR QL: NORMAL
HDLC SERPL-MCNC: 52 MG/DL (ref 40–60)
LDLC SERPL CALC-MCNC: 68 MG/DL (ref 0–100)
LDLC/HDLC SERPL: 1.32 {RATIO}
POTASSIUM BLD-SCNC: 4.6 MMOL/L (ref 3.5–5.2)
PROT SERPL-MCNC: 7.1 G/DL (ref 6–8.5)
SODIUM BLD-SCNC: 144 MMOL/L (ref 136–145)
TRIGL SERPL-MCNC: 133 MG/DL (ref 0–150)
VLDLC SERPL-MCNC: 26.6 MG/DL (ref 5–40)

## 2019-05-17 PROCEDURE — 80053 COMPREHEN METABOLIC PANEL: CPT | Performed by: PHYSICIAN ASSISTANT

## 2019-05-17 PROCEDURE — 80061 LIPID PANEL: CPT | Performed by: PHYSICIAN ASSISTANT

## 2019-05-17 PROCEDURE — 86803 HEPATITIS C AB TEST: CPT | Performed by: PHYSICIAN ASSISTANT

## 2019-05-17 PROCEDURE — 99214 OFFICE O/P EST MOD 30 MIN: CPT | Performed by: INTERNAL MEDICINE

## 2019-05-17 RX ORDER — PRAVASTATIN SODIUM 80 MG/1
80 TABLET ORAL DAILY
Qty: 90 TABLET | Refills: 3 | Status: SHIPPED | OUTPATIENT
Start: 2019-05-17 | End: 2021-06-22 | Stop reason: SDUPTHER

## 2019-05-17 RX ORDER — VALACYCLOVIR HYDROCHLORIDE 500 MG/1
500 TABLET, FILM COATED ORAL DAILY
Qty: 30 TABLET | Refills: 5 | Status: SHIPPED | OUTPATIENT
Start: 2019-05-17

## 2019-05-17 NOTE — PROGRESS NOTES
Chief Complaint   Patient presents with   • Blood work     pt is fasting       Subjective   Adriana Diamond is a 53 y.o. female.       History of Present Illness     Pt is doing well with her mouthpiece- using the ambien sparingly because she is sleeping better.     Has recurrent blister in the back of her mouth that has resolved with valtrex in the past. Has not tried taking valtrex daily to prevent it.     Fasting to check cholesterol today. Taking pravastatin daily.    Has recurrence of cracked areas on the sides of her mouth. Saw derm for this in the past- was given compounded cream, no improvement. Uses aquaphor prn but it seems to return.      Current Outpatient Medications:   •  benzocaine (ORAJEL) 10 % mucosal gel, Apply to oral lesion QID prn, Disp: 1 tube, Rfl: 0  •  melatonin 5 MG tablet tablet, Take 5 mg by mouth., Disp: , Rfl:   •  Multiple Vitamins-Minerals (MULTI-VITAMIN GUMMIES) chewable tablet, Chew daily., Disp: , Rfl:   •  Omeprazole-Sodium Bicarbonate (ZEGERID PO), Take  by mouth., Disp: , Rfl:   •  ondansetron (ZOFRAN) 4 MG tablet, Take 1 tablet by mouth Every 6 (Six) Hours As Needed for Nausea or Vomiting., Disp: 12 tablet, Rfl: 0  •  pravastatin (PRAVACHOL) 80 MG tablet, Take 1 tablet by mouth Daily., Disp: 90 tablet, Rfl: 3  •  valACYclovir (VALTREX) 500 MG tablet, Take 1 tablet by mouth Daily. Take 1 tablet by mouth 1 (One) Time for 1 dose. Take at onset of symptoms, repeat as needed., Disp: 30 tablet, Rfl: 5  •  Zolpidem Tartrate (AMBIEN PO), Take  by mouth., Disp: , Rfl:      PMFSH  The following portions of the patient's history were reviewed and updated as appropriate: allergies, current medications, past family history, past medical history, past social history, past surgical history and problem list.    Review of Systems   Constitutional: Negative for activity change, appetite change and fatigue.   HENT: Negative for congestion and rhinorrhea.    Respiratory: Negative for chest  "tightness and shortness of breath.    Cardiovascular: Negative for chest pain and palpitations.   Gastrointestinal: Negative for abdominal pain.   Genitourinary: Negative for dysuria.   Musculoskeletal: Negative for arthralgias and myalgias.   Skin: Positive for wound.   Neurological: Negative for dizziness, weakness, light-headedness and headaches.   Psychiatric/Behavioral: Negative for dysphoric mood. The patient is not nervous/anxious.        Objective   /64   Pulse 76   Temp 98.1 °F (36.7 °C)   Ht 149.9 cm (59\")   Wt 77.6 kg (171 lb)   SpO2 98%   BMI 34.54 kg/m²     Physical Exam   Constitutional: She appears well-developed and well-nourished.   HENT:   Head: Normocephalic.   Right Ear: Hearing, tympanic membrane, external ear and ear canal normal.   Left Ear: Hearing, tympanic membrane, external ear and ear canal normal.   Nose: Nose normal.   Mouth/Throat: Oropharynx is clear and moist.   Erythematous cracking of bilateral lips   Eyes: Conjunctivae are normal. Pupils are equal, round, and reactive to light.   Neck: Normal range of motion.   Cardiovascular: Normal rate, regular rhythm and normal heart sounds.   Pulmonary/Chest: Effort normal and breath sounds normal. She has no decreased breath sounds. She has no wheezes. She has no rhonchi. She has no rales.   Musculoskeletal: Normal range of motion.   Neurological: She is alert.   Skin: Skin is warm and dry.   Psychiatric: She has a normal mood and affect. Her behavior is normal.   Nursing note and vitals reviewed.      Results for orders placed or performed in visit on 05/17/19   Comprehensive Metabolic Panel   Result Value Ref Range    Glucose 117 (H) 65 - 99 mg/dL    BUN 11 6 - 20 mg/dL    Creatinine 0.67 0.57 - 1.00 mg/dL    Sodium 144 136 - 145 mmol/L    Potassium 4.6 3.5 - 5.2 mmol/L    Chloride 105 98 - 107 mmol/L    CO2 25.2 22.0 - 29.0 mmol/L    Calcium 10.0 8.6 - 10.5 mg/dL    Total Protein 7.1 6.0 - 8.5 g/dL    Albumin 4.30 3.50 - 5.20 " g/dL    ALT (SGPT) 60 (H) 1 - 33 U/L    AST (SGOT) 35 (H) 1 - 32 U/L    Alkaline Phosphatase 107 39 - 117 U/L    Total Bilirubin 0.4 0.2 - 1.2 mg/dL    eGFR Non African Amer 92 >60 mL/min/1.73    Globulin 2.8 gm/dL    A/G Ratio 1.5 g/dL    BUN/Creatinine Ratio 16.4 7.0 - 25.0    Anion Gap 13.8 mmol/L   Lipid Panel   Result Value Ref Range    Total Cholesterol 147 0 - 200 mg/dL    Triglycerides 133 0 - 150 mg/dL    HDL Cholesterol 52 40 - 60 mg/dL    LDL Cholesterol  68 0 - 100 mg/dL    VLDL Cholesterol 26.6 5 - 40 mg/dL    LDL/HDL Ratio 1.32    Hepatitis C Antibody   Result Value Ref Range    Hepatitis C Ab Non-Reactive Non-Reactive        ASSESSMENT/PLAN    Problem List Items Addressed This Visit        Cardiovascular and Mediastinum    Hyperlipidemia     Check lipid profile. Further recs based on results.         Relevant Medications    pravastatin (PRAVACHOL) 80 MG tablet    Other Relevant Orders    Comprehensive Metabolic Panel (Completed)    Lipid Panel (Completed)       Other    Recurrent cold sores - Primary     Start daily valtrex to see if it prevents oral lesion.         Relevant Medications    valACYclovir (VALTREX) 500 MG tablet      Other Visit Diagnoses     Oral lesion        Trial of eucrisa BID, gave pt sample. Will send in rx if it helps.    Relevant Medications    valACYclovir (VALTREX) 500 MG tablet    Need for hepatitis C screening test        Relevant Orders    Hepatitis C Antibody (Completed)               Return in about 6 months (around 11/17/2019) for Annual.

## 2019-05-26 NOTE — PROGRESS NOTES
Your labs show that your blood sugar and liver enzymes are elevated but remain stable. Please continue to reduce the carbohydrates, alcohol and tylenol that you use.    Everything else looks fine!

## 2019-06-10 ENCOUNTER — TRANSCRIBE ORDERS (OUTPATIENT)
Dept: ADMINISTRATIVE | Facility: HOSPITAL | Age: 54
End: 2019-06-10

## 2019-06-10 DIAGNOSIS — Z12.31 VISIT FOR SCREENING MAMMOGRAM: Primary | ICD-10-CM

## 2019-08-02 ENCOUNTER — HOSPITAL ENCOUNTER (OUTPATIENT)
Dept: MAMMOGRAPHY | Facility: HOSPITAL | Age: 54
Discharge: HOME | End: 2019-08-02
Admitting: OBSTETRICS & GYNECOLOGY

## 2019-08-02 DIAGNOSIS — Z12.31 VISIT FOR SCREENING MAMMOGRAM: ICD-10-CM

## 2019-08-02 PROCEDURE — 77063 BREAST TOMOSYNTHESIS BI: CPT | Performed by: RADIOLOGY

## 2019-08-02 PROCEDURE — 77067 SCR MAMMO BI INCL CAD: CPT | Performed by: RADIOLOGY

## 2019-08-02 PROCEDURE — 77067 SCR MAMMO BI INCL CAD: CPT

## 2019-08-02 PROCEDURE — 77063 BREAST TOMOSYNTHESIS BI: CPT

## 2019-10-30 ENCOUNTER — PATIENT MESSAGE (OUTPATIENT)
Dept: INTERNAL MEDICINE | Facility: CLINIC | Age: 54
End: 2019-10-30

## 2020-05-07 ENCOUNTER — TRANSCRIBE ORDERS (OUTPATIENT)
Dept: ADMINISTRATIVE | Facility: HOSPITAL | Age: 55
End: 2020-05-07

## 2020-05-07 DIAGNOSIS — Z12.31 VISIT FOR SCREENING MAMMOGRAM: Primary | ICD-10-CM

## 2020-05-11 ENCOUNTER — LAB (OUTPATIENT)
Dept: LAB | Facility: HOSPITAL | Age: 55
End: 2020-05-11

## 2020-05-11 ENCOUNTER — TRANSCRIBE ORDERS (OUTPATIENT)
Dept: LAB | Facility: HOSPITAL | Age: 55
End: 2020-05-11

## 2020-05-11 DIAGNOSIS — E55.9 VITAMIN D DEFICIENCY: ICD-10-CM

## 2020-05-11 DIAGNOSIS — M81.0 SENILE OSTEOPOROSIS: ICD-10-CM

## 2020-05-11 DIAGNOSIS — M81.0 SENILE OSTEOPOROSIS: Primary | ICD-10-CM

## 2020-05-11 DIAGNOSIS — E58 CALCIUM DEFICIENCY: ICD-10-CM

## 2020-05-11 LAB
ALBUMIN SERPL-MCNC: 4.7 G/DL (ref 3.5–5.2)
ALP SERPL-CCNC: 111 U/L (ref 39–117)
ALT SERPL W P-5'-P-CCNC: 101 U/L (ref 1–33)
AST SERPL-CCNC: 57 U/L (ref 1–32)
BILIRUB CONJ SERPL-MCNC: <0.2 MG/DL (ref 0.2–0.3)
BILIRUB INDIRECT SERPL-MCNC: ABNORMAL MG/DL
BILIRUB SERPL-MCNC: 0.5 MG/DL (ref 0.2–1.2)
CALCIUM SPEC-SCNC: 9.9 MG/DL (ref 8.6–10.5)
CREAT BLD-MCNC: 0.63 MG/DL (ref 0.57–1)
DEPRECATED RDW RBC AUTO: 43.8 FL (ref 37–54)
ERYTHROCYTE [DISTWIDTH] IN BLOOD BY AUTOMATED COUNT: 13.2 % (ref 12.3–15.4)
GFR SERPL CREATININE-BSD FRML MDRD: 98 ML/MIN/1.73
HCT VFR BLD AUTO: 40.6 % (ref 34–46.6)
HGB BLD-MCNC: 13.8 G/DL (ref 12–15.9)
MCH RBC QN AUTO: 30.9 PG (ref 26.6–33)
MCHC RBC AUTO-ENTMCNC: 34 G/DL (ref 31.5–35.7)
MCV RBC AUTO: 90.8 FL (ref 79–97)
PHOSPHATE SERPL-MCNC: 3.5 MG/DL (ref 2.5–4.5)
PLATELET # BLD AUTO: 176 10*3/MM3 (ref 140–450)
PMV BLD AUTO: 11.9 FL (ref 6–12)
PROT SERPL-MCNC: 7.2 G/DL (ref 6–8.5)
PTH-INTACT SERPL-MCNC: 43.1 PG/ML (ref 15–65)
RBC # BLD AUTO: 4.47 10*6/MM3 (ref 3.77–5.28)
WBC NRBC COR # BLD: 5.91 10*3/MM3 (ref 3.4–10.8)

## 2020-05-11 PROCEDURE — 85027 COMPLETE CBC AUTOMATED: CPT | Performed by: ORTHOPAEDIC SURGERY

## 2020-05-11 PROCEDURE — 82306 VITAMIN D 25 HYDROXY: CPT | Performed by: ORTHOPAEDIC SURGERY

## 2020-05-11 PROCEDURE — 83970 ASSAY OF PARATHORMONE: CPT | Performed by: ORTHOPAEDIC SURGERY

## 2020-05-11 PROCEDURE — 82565 ASSAY OF CREATININE: CPT | Performed by: ORTHOPAEDIC SURGERY

## 2020-05-11 PROCEDURE — 84100 ASSAY OF PHOSPHORUS: CPT | Performed by: ORTHOPAEDIC SURGERY

## 2020-05-11 PROCEDURE — 82310 ASSAY OF CALCIUM: CPT | Performed by: ORTHOPAEDIC SURGERY

## 2020-05-11 PROCEDURE — 80076 HEPATIC FUNCTION PANEL: CPT | Performed by: ORTHOPAEDIC SURGERY

## 2020-05-11 PROCEDURE — 36415 COLL VENOUS BLD VENIPUNCTURE: CPT

## 2020-05-12 LAB — 25(OH)D3 SERPL-MCNC: 37.1 NG/ML (ref 30–100)

## 2020-05-27 ENCOUNTER — TELEPHONE (OUTPATIENT)
Dept: INTERNAL MEDICINE | Facility: CLINIC | Age: 55
End: 2020-05-27

## 2020-05-27 NOTE — TELEPHONE ENCOUNTER
Pt called because she wants to confirm that labs were sent to [facility]     Please call pt to advise   [phone]

## 2020-08-07 ENCOUNTER — HOSPITAL ENCOUNTER (OUTPATIENT)
Dept: MAMMOGRAPHY | Facility: HOSPITAL | Age: 55
Discharge: HOME | End: 2020-08-07
Admitting: OBSTETRICS & GYNECOLOGY

## 2020-08-07 DIAGNOSIS — Z12.31 VISIT FOR SCREENING MAMMOGRAM: ICD-10-CM

## 2020-08-07 PROCEDURE — 77067 SCR MAMMO BI INCL CAD: CPT | Performed by: RADIOLOGY

## 2020-08-07 PROCEDURE — 77063 BREAST TOMOSYNTHESIS BI: CPT | Performed by: RADIOLOGY

## 2020-08-07 PROCEDURE — 77063 BREAST TOMOSYNTHESIS BI: CPT

## 2020-08-07 PROCEDURE — 77067 SCR MAMMO BI INCL CAD: CPT

## 2021-06-15 ENCOUNTER — TRANSCRIBE ORDERS (OUTPATIENT)
Dept: ADMINISTRATIVE | Facility: HOSPITAL | Age: 56
End: 2021-06-15

## 2021-06-15 ENCOUNTER — TELEPHONE (OUTPATIENT)
Dept: INTERNAL MEDICINE | Facility: CLINIC | Age: 56
End: 2021-06-15

## 2021-06-15 DIAGNOSIS — Z12.31 VISIT FOR SCREENING MAMMOGRAM: Primary | ICD-10-CM

## 2021-06-15 NOTE — TELEPHONE ENCOUNTER
Caller: Adriana Diamond    Relationship: Self    Best call back number: [phone]     What orders are you requesting (i.e. lab or imaging): LAB WORK    In what timeframe would the patient need to come in: ASAP    Where will you receive your lab/imaging services: LAB    Additional notes:

## 2021-06-15 NOTE — TELEPHONE ENCOUNTER
I have not seen her in a couple years so she would need to schedule an annual physical and we can do fasting labs at that time.

## 2021-06-15 NOTE — TELEPHONE ENCOUNTER
Made an appointment for Tues 6/22, patient needs to have paperwork for insurance turned in by August

## 2021-06-22 ENCOUNTER — LAB (OUTPATIENT)
Dept: LAB | Facility: HOSPITAL | Age: 56
End: 2021-06-22

## 2021-06-22 ENCOUNTER — OFFICE VISIT (OUTPATIENT)
Dept: INTERNAL MEDICINE | Facility: CLINIC | Age: 56
End: 2021-06-22

## 2021-06-22 VITALS
HEART RATE: 87 BPM | BODY MASS INDEX: 35.69 KG/M2 | WEIGHT: 181.8 LBS | HEIGHT: 60 IN | SYSTOLIC BLOOD PRESSURE: 142 MMHG | DIASTOLIC BLOOD PRESSURE: 100 MMHG | OXYGEN SATURATION: 96 %

## 2021-06-22 DIAGNOSIS — R73.9 HYPERGLYCEMIA: ICD-10-CM

## 2021-06-22 DIAGNOSIS — I10 ESSENTIAL HYPERTENSION: ICD-10-CM

## 2021-06-22 DIAGNOSIS — E78.5 HYPERLIPIDEMIA, UNSPECIFIED HYPERLIPIDEMIA TYPE: ICD-10-CM

## 2021-06-22 DIAGNOSIS — Z78.0 POSTMENOPAUSAL: ICD-10-CM

## 2021-06-22 DIAGNOSIS — Z00.00 HEALTH CARE MAINTENANCE: Primary | ICD-10-CM

## 2021-06-22 DIAGNOSIS — Z23 NEED FOR TDAP VACCINATION: ICD-10-CM

## 2021-06-22 DIAGNOSIS — Z00.00 HEALTH CARE MAINTENANCE: ICD-10-CM

## 2021-06-22 DIAGNOSIS — E66.9 OBESITY (BMI 30-39.9): ICD-10-CM

## 2021-06-22 LAB
ALBUMIN SERPL-MCNC: 4.6 G/DL (ref 3.5–5.2)
ALBUMIN/GLOB SERPL: 2.2 G/DL
ALP SERPL-CCNC: 104 U/L (ref 39–117)
ALT SERPL W P-5'-P-CCNC: 75 U/L (ref 1–33)
ANION GAP SERPL CALCULATED.3IONS-SCNC: 12.7 MMOL/L (ref 5–15)
AST SERPL-CCNC: 47 U/L (ref 1–32)
BASOPHILS # BLD AUTO: 0.08 10*3/MM3 (ref 0–0.2)
BASOPHILS NFR BLD AUTO: 1.5 % (ref 0–1.5)
BILIRUB SERPL-MCNC: 0.4 MG/DL (ref 0–1.2)
BUN SERPL-MCNC: 12 MG/DL (ref 6–20)
BUN/CREAT SERPL: 17.9 (ref 7–25)
CALCIUM SPEC-SCNC: 9.2 MG/DL (ref 8.6–10.5)
CHLORIDE SERPL-SCNC: 105 MMOL/L (ref 98–107)
CHOLEST SERPL-MCNC: 187 MG/DL (ref 0–200)
CO2 SERPL-SCNC: 22.3 MMOL/L (ref 22–29)
CREAT SERPL-MCNC: 0.67 MG/DL (ref 0.57–1)
DEPRECATED RDW RBC AUTO: 44.7 FL (ref 37–54)
EOSINOPHIL # BLD AUTO: 0.02 10*3/MM3 (ref 0–0.4)
EOSINOPHIL NFR BLD AUTO: 0.4 % (ref 0.3–6.2)
ERYTHROCYTE [DISTWIDTH] IN BLOOD BY AUTOMATED COUNT: 13.2 % (ref 12.3–15.4)
GFR SERPL CREATININE-BSD FRML MDRD: 91 ML/MIN/1.73
GLOBULIN UR ELPH-MCNC: 2.1 GM/DL
GLUCOSE SERPL-MCNC: 122 MG/DL (ref 65–99)
HBA1C MFR BLD: 5.51 % (ref 4.8–5.6)
HCT VFR BLD AUTO: 44.6 % (ref 34–46.6)
HDLC SERPL-MCNC: 48 MG/DL (ref 40–60)
HGB BLD-MCNC: 14.8 G/DL (ref 12–15.9)
IMM GRANULOCYTES # BLD AUTO: 0.04 10*3/MM3 (ref 0–0.05)
IMM GRANULOCYTES NFR BLD AUTO: 0.7 % (ref 0–0.5)
LDLC SERPL CALC-MCNC: 113 MG/DL (ref 0–100)
LDLC/HDLC SERPL: 2.29 {RATIO}
LYMPHOCYTES # BLD AUTO: 1.13 10*3/MM3 (ref 0.7–3.1)
LYMPHOCYTES NFR BLD AUTO: 21.1 % (ref 19.6–45.3)
MCH RBC QN AUTO: 30.5 PG (ref 26.6–33)
MCHC RBC AUTO-ENTMCNC: 33.2 G/DL (ref 31.5–35.7)
MCV RBC AUTO: 92 FL (ref 79–97)
MONOCYTES # BLD AUTO: 0.46 10*3/MM3 (ref 0.1–0.9)
MONOCYTES NFR BLD AUTO: 8.6 % (ref 5–12)
NEUTROPHILS NFR BLD AUTO: 3.62 10*3/MM3 (ref 1.7–7)
NEUTROPHILS NFR BLD AUTO: 67.7 % (ref 42.7–76)
NRBC BLD AUTO-RTO: 0 /100 WBC (ref 0–0.2)
PLATELET # BLD AUTO: 164 10*3/MM3 (ref 140–450)
PMV BLD AUTO: 12.4 FL (ref 6–12)
POTASSIUM SERPL-SCNC: 4.8 MMOL/L (ref 3.5–5.2)
PROT SERPL-MCNC: 6.7 G/DL (ref 6–8.5)
RBC # BLD AUTO: 4.85 10*6/MM3 (ref 3.77–5.28)
SODIUM SERPL-SCNC: 140 MMOL/L (ref 136–145)
TRIGL SERPL-MCNC: 146 MG/DL (ref 0–150)
TSH SERPL DL<=0.05 MIU/L-ACNC: 2.16 UIU/ML (ref 0.27–4.2)
VLDLC SERPL-MCNC: 26 MG/DL (ref 5–40)
WBC # BLD AUTO: 5.35 10*3/MM3 (ref 3.4–10.8)

## 2021-06-22 PROCEDURE — 80061 LIPID PANEL: CPT

## 2021-06-22 PROCEDURE — 90471 IMMUNIZATION ADMIN: CPT | Performed by: PHYSICIAN ASSISTANT

## 2021-06-22 PROCEDURE — 90715 TDAP VACCINE 7 YRS/> IM: CPT | Performed by: PHYSICIAN ASSISTANT

## 2021-06-22 PROCEDURE — 85025 COMPLETE CBC W/AUTO DIFF WBC: CPT

## 2021-06-22 PROCEDURE — 80053 COMPREHEN METABOLIC PANEL: CPT

## 2021-06-22 PROCEDURE — 83036 HEMOGLOBIN GLYCOSYLATED A1C: CPT

## 2021-06-22 PROCEDURE — 84443 ASSAY THYROID STIM HORMONE: CPT

## 2021-06-22 PROCEDURE — 99213 OFFICE O/P EST LOW 20 MIN: CPT | Performed by: PHYSICIAN ASSISTANT

## 2021-06-22 PROCEDURE — 99396 PREV VISIT EST AGE 40-64: CPT | Performed by: PHYSICIAN ASSISTANT

## 2021-06-22 RX ORDER — PRAVASTATIN SODIUM 80 MG/1
80 TABLET ORAL DAILY
Qty: 90 TABLET | Refills: 3 | Status: SHIPPED | OUTPATIENT
Start: 2021-06-22 | End: 2021-08-26 | Stop reason: SDUPTHER

## 2021-06-22 RX ORDER — HALOBETASOL PROPIONATE 0.05 %
OINTMENT (GRAM) TOPICAL
COMMUNITY
Start: 2021-04-06

## 2021-06-22 NOTE — ASSESSMENT & PLAN NOTE
Blood pressure elevated in office today. Pt will monitor BP readings at home and rtc in 4 weeks for recheck.

## 2021-06-22 NOTE — ASSESSMENT & PLAN NOTE
Patient's (Body mass index is 35.51 kg/m².) indicates that they are obese (BMI >30) with obesity-related health conditions that include obstructive sleep apnea and dyslipidemias . Obesity is worsening. BMI is is above average; BMI management plan is completed. We discussed low calorie, low carb based diet program, portion control and increasing exercise.

## 2021-06-22 NOTE — PROGRESS NOTES
"Chief Complaint   Patient presents with   • Annual Exam       Subjective     History of Present Illness    Adriana Diamond is a 55 y.o. female.     The patient is being seen for a health maintenance evaluation.  The last health maintenance was >5 year(s) ago.    Social History  Adriana  does not smoke cigarettes.   She drinks frequent alcohol. Drinks 1 glass of wine a day.  She does not use illicit drugs.    General History  Adriana  does have regular dental visits.  She does not complain of vision problems. Had Lasik surgery 10-15 years. Last eye exam was 2020.  Immunizations are not up to date. The patient needs the following immunizations: declines covid vaccine; TDaP to be done today    Lifestyle  Adriana  consumes in general, a \"healthy\" diet  .  She exercises intermittently.    Reproductive Health  Adriana  is postmenopausal.  She reports periods are nonexistent since menopause age 35-35.  She is sexually active. Her contraceptive plan is no method.    Screening  Last pap was 8/7/2020 by Dr Garcia. History of abnormal pap smear or family history of gyn cancer: remote history of abnormal pap; no gyn CA  Last mammogram was  8/2020. Personal or family history of abnormal mammograms or breast cancer: none  Last colonoscopy was 2017 by Dr Chambers. Family history of colon cancer: none  Last DEXA was years ago.                 Current Outpatient Medications:   •  melatonin 5 MG tablet tablet, Take 5 mg by mouth., Disp: , Rfl:   •  Multiple Vitamins-Minerals (MULTI-VITAMIN GUMMIES) chewable tablet, Chew daily., Disp: , Rfl:   •  Omeprazole-Sodium Bicarbonate (ZEGERID PO), Take  by mouth., Disp: , Rfl:   •  pravastatin (PRAVACHOL) 80 MG tablet, Take 1 tablet by mouth Daily., Disp: 90 tablet, Rfl: 3  •  valACYclovir (VALTREX) 500 MG tablet, Take 1 tablet by mouth Daily. Take 1 tablet by mouth 1 (One) Time for 1 dose. Take at onset of symptoms, repeat as needed., Disp: 30 tablet, Rfl: 5  •  Zolpidem Tartrate (AMBIEN PO), " "Take  by mouth., Disp: , Rfl:   •  halobetasol (ULTRAVATE) 0.05 % ointment, , Disp: , Rfl:      [facility]  The following portions of the patient's history were reviewed and updated as appropriate: allergies, current medications, past family history, past medical history, past social history, past surgical history and problem list.    Review of Systems   Constitutional: Negative for appetite change, fever and unexpected weight change.   HENT: Negative for ear pain, facial swelling and sore throat.    Eyes: Negative for pain and visual disturbance.   Respiratory: Negative for chest tightness, shortness of breath and wheezing.    Cardiovascular: Negative for chest pain and palpitations.   Gastrointestinal: Negative for abdominal pain and blood in stool.   Endocrine: Negative.    Genitourinary: Negative for difficulty urinating and hematuria.   Musculoskeletal: Negative for joint swelling.   Neurological: Negative for tremors, seizures, syncope, light-headedness and headaches.   Hematological: Negative for adenopathy.   Psychiatric/Behavioral: Negative.        Objective   /100   Pulse 87   Ht 152.4 cm (60\")   Wt 82.5 kg (181 lb 12.8 oz)   SpO2 96%   BMI 35.51 kg/m²     Physical Exam  Vitals and nursing note reviewed.   Constitutional:       General: She is not in acute distress.     Appearance: She is well-developed. She is not diaphoretic.   HENT:      Head: Normocephalic and atraumatic. Hair is normal.      Right Ear: Hearing, tympanic membrane, ear canal and external ear normal. No decreased hearing noted. No drainage.      Left Ear: Hearing, tympanic membrane, ear canal and external ear normal. No decreased hearing noted.      Nose: No nasal deformity.   Eyes:      General: Lids are normal. Lids are everted, no foreign bodies appreciated.         Right eye: No discharge.         Left eye: No discharge.      Conjunctiva/sclera: Conjunctivae normal.      Pupils: Pupils are equal, round, and reactive to light. "   Neck:      Thyroid: No thyromegaly.      Vascular: No JVD.      Trachea: No tracheal deviation.   Cardiovascular:      Rate and Rhythm: Normal rate and regular rhythm.      Pulses: Normal pulses.      Heart sounds: Normal heart sounds. No murmur heard.   No friction rub. No gallop.    Pulmonary:      Effort: Pulmonary effort is normal. No respiratory distress.      Breath sounds: Normal breath sounds. No wheezing or rales.   Chest:      Chest wall: No tenderness.   Abdominal:      General: Bowel sounds are normal. There is no distension.      Palpations: Abdomen is soft. There is no mass.      Tenderness: There is no abdominal tenderness. There is no guarding or rebound.      Hernia: No hernia is present.   Musculoskeletal:         General: No tenderness or deformity. Normal range of motion.      Cervical back: Normal range of motion and neck supple.   Lymphadenopathy:      Cervical: No cervical adenopathy.   Skin:     General: Skin is warm and dry.      Findings: No erythema or rash.   Neurological:      Mental Status: She is alert and oriented to person, place, and time.      Cranial Nerves: No cranial nerve deficit.      Motor: No abnormal muscle tone.      Coordination: Coordination normal.      Deep Tendon Reflexes: Reflexes are normal and symmetric. Reflexes normal.   Psychiatric:         Behavior: Behavior normal.         Thought Content: Thought content normal.         Judgment: Judgment normal.              ASSESSMENT/PLAN    Diagnoses and all orders for this visit:    1. Health care maintenance (Primary)  Assessment & Plan:  Immunizations: TDaP to be done today; declines covid vaccine  Eye exam: done 2020  Pap Smear: done 8/2020, managed by Dr denton  Mammogram: done 8/2020, scheduled for 8/2021  Dexa: ordered  Colonoscopy: done 2017 by Dr Chambers, repeat 2022  Labs: fasting labs ordered    Counseled patient regarding multimodal approach with healthy nutrition, healthy sleep, regular physical  activity, social activities, counseling, safety measures and medications.     Orders:  -     CBC & Differential; Future  -     Comprehensive Metabolic Panel; Future  -     Lipid Panel; Future  -     TSH; Future    2. Hyperlipidemia, unspecified hyperlipidemia type  Assessment & Plan:  Check lipid profile. Further recommendations based on results.      Orders:  -     pravastatin (PRAVACHOL) 80 MG tablet; Take 1 tablet by mouth Daily.  Dispense: 90 tablet; Refill: 3    3. Postmenopausal  -     DEXA Bone Density Axial; Future    4. Obesity (BMI 30-39.9)  Assessment & Plan:  Patient's (Body mass index is 35.51 kg/m².) indicates that they are obese (BMI >30) with obesity-related health conditions that include obstructive sleep apnea and dyslipidemias . Obesity is worsening. BMI is is above average; BMI management plan is completed. We discussed low calorie, low carb based diet program, portion control and increasing exercise.       5. Need for Tdap vaccination  -     Tdap Vaccine Greater Than or Equal To 6yo IM    6. Essential hypertension  Assessment & Plan:  Blood pressure elevated in office today. Pt will monitor BP readings at home and rtc in 4 weeks for recheck.              Return in about 4 weeks (around 7/20/2021) for Follow up, Annual with fasting labs in 1 year.

## 2021-06-22 NOTE — ASSESSMENT & PLAN NOTE
Immunizations: TDaP to be done today; declines covid vaccine  Eye exam: done 2020  Pap Smear: done 8/2020, managed by Dr denton  Mammogram: done 8/2020, scheduled for 8/2021  Dexa: ordered  Colonoscopy: done 2017 by Dr Chambers, repeat 2022  Labs: fasting labs ordered    Counseled patient regarding multimodal approach with healthy nutrition, healthy sleep, regular physical activity, social activities, counseling, safety measures and medications.

## 2021-06-24 ENCOUNTER — TELEPHONE (OUTPATIENT)
Dept: INTERNAL MEDICINE | Facility: CLINIC | Age: 56
End: 2021-06-24

## 2021-06-24 NOTE — TELEPHONE ENCOUNTER
Caller: Adriana Diamond    Relationship: Self    Best call back number: [phone]    What form or medical record are you requesting: [employer]    Who is requesting this form or medical record from you: [facility]    How would you like to receive the form or medical records (pick-up, mail, fax): PATIENT   If pick-up, provide patient with address and location details    Timeframe paperwork needed: ASAP    Additional notes: PATIENT CALLING IN TO SEE IF [employer] HAS COMPLETED THE PAPERWORK SHE GAVE HER AT THE APPOINTMENT ON Tuesday. SHE CAN PICK IT TOMORROW

## 2021-06-30 NOTE — PROGRESS NOTES
Your labs show that your fasting blood sugar was elevated. I added a hemoglobin a1c (3 month average of blood sugars) and it was normal. Keep working on maintaining a low carbohydrate diet to keep this from going higher.    Everything else is stable and looks fine.

## 2021-07-19 ENCOUNTER — TELEPHONE (OUTPATIENT)
Dept: INTERNAL MEDICINE | Facility: CLINIC | Age: 56
End: 2021-07-19

## 2021-07-19 NOTE — TELEPHONE ENCOUNTER
PATIENT CALLED JUST TO LET [employer] KNOW SHE HAS HER RESULTS FROM HER DEXASCAN FROM LAST YEAR AND WILL BRING THEM WITH HER TO HER APPOINTMENT ON 08/02/21.

## 2021-08-02 ENCOUNTER — OFFICE VISIT (OUTPATIENT)
Dept: INTERNAL MEDICINE | Facility: CLINIC | Age: 56
End: 2021-08-02

## 2021-08-02 VITALS
DIASTOLIC BLOOD PRESSURE: 86 MMHG | OXYGEN SATURATION: 98 % | SYSTOLIC BLOOD PRESSURE: 140 MMHG | WEIGHT: 182 LBS | BODY MASS INDEX: 35.54 KG/M2 | HEART RATE: 98 BPM

## 2021-08-02 DIAGNOSIS — M81.0 AGE RELATED OSTEOPOROSIS, UNSPECIFIED PATHOLOGICAL FRACTURE PRESENCE: Primary | ICD-10-CM

## 2021-08-02 PROCEDURE — 99213 OFFICE O/P EST LOW 20 MIN: CPT | Performed by: PHYSICIAN ASSISTANT

## 2021-08-02 RX ORDER — ALENDRONATE SODIUM 70 MG/1
70 TABLET ORAL
Qty: 12 TABLET | Refills: 3 | Status: SHIPPED | OUTPATIENT
Start: 2021-08-02 | End: 2022-08-10 | Stop reason: SDUPTHER

## 2021-08-02 NOTE — PROGRESS NOTES
Chief Complaint   Patient presents with   • Follow-up   • Hyperlipidemia   • Hypertension       Subjective     Adriana Diamond is a 55 y.o. female.        History of Present Illness     Pt has been monitoring her blood pressure at home and it has been well controlled. Ranging from 120-130s systolic. Pt denies any shortness of breath or headache or chest pain.     She brought record of DEXA that was done in 2020. She thinks she was put on fosamax for about 6 weeks by her gynecologist. Has not taken it in over a year. Did not have any problems with it.      Current Outpatient Medications:   •  halobetasol (ULTRAVATE) 0.05 % ointment, , Disp: , Rfl:   •  melatonin 5 MG tablet tablet, Take 5 mg by mouth., Disp: , Rfl:   •  Multiple Vitamins-Minerals (MULTI-VITAMIN GUMMIES) chewable tablet, Chew daily., Disp: , Rfl:   •  Omeprazole-Sodium Bicarbonate (ZEGERID PO), Take  by mouth., Disp: , Rfl:   •  pravastatin (PRAVACHOL) 80 MG tablet, Take 1 tablet by mouth Daily., Disp: 90 tablet, Rfl: 3  •  valACYclovir (VALTREX) 500 MG tablet, Take 1 tablet by mouth Daily. Take 1 tablet by mouth 1 (One) Time for 1 dose. Take at onset of symptoms, repeat as needed., Disp: 30 tablet, Rfl: 5  •  Zolpidem Tartrate (AMBIEN PO), Take  by mouth., Disp: , Rfl:   •  alendronate (Fosamax) 70 MG tablet, Take 1 tablet by mouth Every 7 (Seven) Days., Disp: 12 tablet, Rfl: 3     PMFSH  The following portions of the patient's history were reviewed and updated as appropriate: allergies, current medications, past family history, past medical history, past social history, past surgical history and problem list.    Review of Systems   Constitutional: Negative for activity change, appetite change and fatigue.   HENT: Negative for congestion and rhinorrhea.    Respiratory: Negative for chest tightness and shortness of breath.    Cardiovascular: Negative for chest pain and palpitations.   Gastrointestinal: Negative for abdominal pain.   Genitourinary:  Negative for dysuria.   Musculoskeletal: Negative for arthralgias and myalgias.   Neurological: Negative for dizziness, weakness, light-headedness and headaches.   Psychiatric/Behavioral: Negative for dysphoric mood. The patient is not nervous/anxious.        Objective   /86   Pulse 98   Wt 82.6 kg (182 lb)   SpO2 98%   Breastfeeding No   BMI 35.54 kg/m²     Physical Exam  Vitals and nursing note reviewed.   Constitutional:       Appearance: She is well-developed.   HENT:      Head: Normocephalic and atraumatic.      Right Ear: External ear normal.      Left Ear: External ear normal.   Eyes:      Conjunctiva/sclera: Conjunctivae normal.   Cardiovascular:      Rate and Rhythm: Normal rate and regular rhythm.   Pulmonary:      Effort: Pulmonary effort is normal.      Breath sounds: Normal breath sounds.   Musculoskeletal:         General: Normal range of motion.      Cervical back: Normal range of motion.   Skin:     General: Skin is warm and dry.   Psychiatric:         Behavior: Behavior normal.              ASSESSMENT/PLAN    Diagnoses and all orders for this visit:    1. Age related osteoporosis, unspecified pathological fracture presence (Primary)  Assessment & Plan:  Start fosamax 70 mg once weekly. Plan to recheck DEXA in 1-2 years. Continue calcium and vitamin D replacement.    Orders:  -     alendronate (Fosamax) 70 MG tablet; Take 1 tablet by mouth Every 7 (Seven) Days.  Dispense: 12 tablet; Refill: 3           Return for Next scheduled follow up.

## 2021-08-04 NOTE — ASSESSMENT & PLAN NOTE
Start fosamax 70 mg once weekly. Plan to recheck DEXA in 1-2 years. Continue calcium and vitamin D replacement.

## 2021-08-06 ENCOUNTER — APPOINTMENT (OUTPATIENT)
Dept: MAMMOGRAPHY | Facility: HOSPITAL | Age: 56
End: 2021-08-06

## 2021-08-09 ENCOUNTER — HOSPITAL ENCOUNTER (OUTPATIENT)
Dept: MAMMOGRAPHY | Facility: HOSPITAL | Age: 56
Discharge: HOME | End: 2021-08-09
Admitting: OBSTETRICS & GYNECOLOGY

## 2021-08-09 ENCOUNTER — APPOINTMENT (OUTPATIENT)
Dept: MAMMOGRAPHY | Facility: HOSPITAL | Age: 56
End: 2021-08-09

## 2021-08-09 DIAGNOSIS — Z12.31 VISIT FOR SCREENING MAMMOGRAM: ICD-10-CM

## 2021-08-09 PROCEDURE — 77063 BREAST TOMOSYNTHESIS BI: CPT | Performed by: RADIOLOGY

## 2021-08-09 PROCEDURE — 77067 SCR MAMMO BI INCL CAD: CPT | Performed by: RADIOLOGY

## 2021-08-09 PROCEDURE — 77063 BREAST TOMOSYNTHESIS BI: CPT

## 2021-08-09 PROCEDURE — 77067 SCR MAMMO BI INCL CAD: CPT

## 2021-08-26 DIAGNOSIS — E78.5 HYPERLIPIDEMIA, UNSPECIFIED HYPERLIPIDEMIA TYPE: ICD-10-CM

## 2021-08-26 RX ORDER — PRAVASTATIN SODIUM 80 MG/1
80 TABLET ORAL DAILY
Qty: 90 TABLET | Refills: 3 | Status: SHIPPED | OUTPATIENT
Start: 2021-08-26 | End: 2022-07-14

## 2022-05-17 ENCOUNTER — TRANSCRIBE ORDERS (OUTPATIENT)
Dept: ADMINISTRATIVE | Facility: HOSPITAL | Age: 57
End: 2022-05-17

## 2022-05-17 DIAGNOSIS — Z12.31 VISIT FOR SCREENING MAMMOGRAM: Primary | ICD-10-CM

## 2022-06-17 ENCOUNTER — TELEPHONE (OUTPATIENT)
Dept: INTERNAL MEDICINE | Facility: CLINIC | Age: 57
End: 2022-06-17

## 2022-06-17 DIAGNOSIS — M81.0 AGE RELATED OSTEOPOROSIS, UNSPECIFIED PATHOLOGICAL FRACTURE PRESENCE: ICD-10-CM

## 2022-06-17 DIAGNOSIS — Z00.00 HEALTH CARE MAINTENANCE: Primary | ICD-10-CM

## 2022-06-17 NOTE — TELEPHONE ENCOUNTER
Caller: Adriana Diamond    Relationship: Self    Best call back number: [phone]    What orders are you requesting (i.e. lab or imaging): ANNUAL FASTING LAB WORK PLUS VIT D    In what timeframe would the patient need to come in: 06/24/2022    Where will you receive your lab/imaging services: IN OFFICE    Additional notes: PATIENT WOULD LIKE TO GET HER LABS DONE BEFORE HER APPOINTMENT. PLEASE CALL PATIENT WHEN ORDER HAS BEEN PLACED      PATIENT NEEDS THIS COMPLETED BEFORE 08/01/2022 FOR PREMIUMS.

## 2022-06-24 ENCOUNTER — LAB (OUTPATIENT)
Dept: LAB | Facility: HOSPITAL | Age: 57
End: 2022-06-24

## 2022-06-24 DIAGNOSIS — Z00.00 HEALTH CARE MAINTENANCE: ICD-10-CM

## 2022-06-24 DIAGNOSIS — M81.0 AGE RELATED OSTEOPOROSIS, UNSPECIFIED PATHOLOGICAL FRACTURE PRESENCE: ICD-10-CM

## 2022-06-24 LAB
ALBUMIN SERPL-MCNC: 4.9 G/DL (ref 3.5–5.2)
ALBUMIN/GLOB SERPL: 2.1 G/DL
ALP SERPL-CCNC: 104 U/L (ref 39–117)
ALT SERPL W P-5'-P-CCNC: 86 U/L (ref 1–33)
ANION GAP SERPL CALCULATED.3IONS-SCNC: 11.7 MMOL/L (ref 5–15)
AST SERPL-CCNC: 65 U/L (ref 1–32)
BASOPHILS # BLD AUTO: 0.07 10*3/MM3 (ref 0–0.2)
BASOPHILS NFR BLD AUTO: 1.5 % (ref 0–1.5)
BILIRUB SERPL-MCNC: 0.6 MG/DL (ref 0–1.2)
BUN SERPL-MCNC: 12 MG/DL (ref 6–20)
BUN/CREAT SERPL: 13.6 (ref 7–25)
CALCIUM SPEC-SCNC: 9.8 MG/DL (ref 8.6–10.5)
CHLORIDE SERPL-SCNC: 104 MMOL/L (ref 98–107)
CHOLEST SERPL-MCNC: 175 MG/DL (ref 0–200)
CO2 SERPL-SCNC: 27.3 MMOL/L (ref 22–29)
CREAT SERPL-MCNC: 0.88 MG/DL (ref 0.57–1)
DEPRECATED RDW RBC AUTO: 43.7 FL (ref 37–54)
EGFRCR SERPLBLD CKD-EPI 2021: 76.8 ML/MIN/1.73
EOSINOPHIL # BLD AUTO: 0.27 10*3/MM3 (ref 0–0.4)
EOSINOPHIL NFR BLD AUTO: 5.7 % (ref 0.3–6.2)
ERYTHROCYTE [DISTWIDTH] IN BLOOD BY AUTOMATED COUNT: 13.1 % (ref 12.3–15.4)
GLOBULIN UR ELPH-MCNC: 2.3 GM/DL
GLUCOSE SERPL-MCNC: 116 MG/DL (ref 65–99)
HCT VFR BLD AUTO: 42.8 % (ref 34–46.6)
HDLC SERPL-MCNC: 50 MG/DL (ref 40–60)
HGB BLD-MCNC: 14.2 G/DL (ref 12–15.9)
IMM GRANULOCYTES # BLD AUTO: 0.03 10*3/MM3 (ref 0–0.05)
IMM GRANULOCYTES NFR BLD AUTO: 0.6 % (ref 0–0.5)
LDLC SERPL CALC-MCNC: 102 MG/DL (ref 0–100)
LDLC/HDLC SERPL: 1.98 {RATIO}
LYMPHOCYTES # BLD AUTO: 1.33 10*3/MM3 (ref 0.7–3.1)
LYMPHOCYTES NFR BLD AUTO: 28.1 % (ref 19.6–45.3)
MCH RBC QN AUTO: 30 PG (ref 26.6–33)
MCHC RBC AUTO-ENTMCNC: 33.2 G/DL (ref 31.5–35.7)
MCV RBC AUTO: 90.3 FL (ref 79–97)
MONOCYTES # BLD AUTO: 0.45 10*3/MM3 (ref 0.1–0.9)
MONOCYTES NFR BLD AUTO: 9.5 % (ref 5–12)
NEUTROPHILS NFR BLD AUTO: 2.58 10*3/MM3 (ref 1.7–7)
NEUTROPHILS NFR BLD AUTO: 54.6 % (ref 42.7–76)
NRBC BLD AUTO-RTO: 0 /100 WBC (ref 0–0.2)
PLATELET # BLD AUTO: 159 10*3/MM3 (ref 140–450)
PMV BLD AUTO: 11.7 FL (ref 6–12)
POTASSIUM SERPL-SCNC: 5.3 MMOL/L (ref 3.5–5.2)
PROT SERPL-MCNC: 7.2 G/DL (ref 6–8.5)
RBC # BLD AUTO: 4.74 10*6/MM3 (ref 3.77–5.28)
SODIUM SERPL-SCNC: 143 MMOL/L (ref 136–145)
TRIGL SERPL-MCNC: 129 MG/DL (ref 0–150)
TSH SERPL DL<=0.05 MIU/L-ACNC: 1.54 UIU/ML (ref 0.27–4.2)
VLDLC SERPL-MCNC: 23 MG/DL (ref 5–40)
WBC NRBC COR # BLD: 4.73 10*3/MM3 (ref 3.4–10.8)

## 2022-06-24 PROCEDURE — 80050 GENERAL HEALTH PANEL: CPT

## 2022-06-24 PROCEDURE — 80061 LIPID PANEL: CPT

## 2022-07-01 DIAGNOSIS — M81.0 AGE RELATED OSTEOPOROSIS, UNSPECIFIED PATHOLOGICAL FRACTURE PRESENCE: ICD-10-CM

## 2022-07-01 RX ORDER — ALENDRONATE SODIUM 70 MG/1
TABLET ORAL
Qty: 12 TABLET | Refills: 3 | OUTPATIENT
Start: 2022-07-01

## 2022-07-14 DIAGNOSIS — E78.5 HYPERLIPIDEMIA, UNSPECIFIED HYPERLIPIDEMIA TYPE: ICD-10-CM

## 2022-07-14 RX ORDER — PRAVASTATIN SODIUM 80 MG/1
TABLET ORAL
Qty: 90 TABLET | Refills: 3 | Status: SHIPPED | OUTPATIENT
Start: 2022-07-14

## 2022-08-10 DIAGNOSIS — M81.0 AGE RELATED OSTEOPOROSIS, UNSPECIFIED PATHOLOGICAL FRACTURE PRESENCE: ICD-10-CM

## 2022-08-10 RX ORDER — ALENDRONATE SODIUM 70 MG/1
70 TABLET ORAL
Qty: 12 TABLET | Refills: 3 | Status: SHIPPED | OUTPATIENT
Start: 2022-08-10

## 2022-08-12 ENCOUNTER — HOSPITAL ENCOUNTER (OUTPATIENT)
Dept: MAMMOGRAPHY | Facility: HOSPITAL | Age: 57
Discharge: HOME | End: 2022-08-12
Admitting: OBSTETRICS & GYNECOLOGY

## 2022-08-12 DIAGNOSIS — Z12.31 VISIT FOR SCREENING MAMMOGRAM: ICD-10-CM

## 2022-08-12 PROCEDURE — 77063 BREAST TOMOSYNTHESIS BI: CPT

## 2022-08-12 PROCEDURE — 77067 SCR MAMMO BI INCL CAD: CPT | Performed by: RADIOLOGY

## 2022-08-12 PROCEDURE — 77063 BREAST TOMOSYNTHESIS BI: CPT | Performed by: RADIOLOGY

## 2022-08-12 PROCEDURE — 77067 SCR MAMMO BI INCL CAD: CPT

## 2022-11-11 ENCOUNTER — TELEPHONE (OUTPATIENT)
Dept: INTERNAL MEDICINE | Facility: CLINIC | Age: 57
End: 2022-11-11

## 2022-11-11 NOTE — TELEPHONE ENCOUNTER
Caller: Adriana Diamond    Relationship to patient: Self    Best call back number: [phone]    What is the call regarding:  PATIENT HAS AN APPOINTMENT IN December FOR A PHYSICAL, BUT SHE HAS HAD ONE WITH HER OB AND IS WANTING TO KNOW IF THIS NEEDS TO BE A PHYSICAL OR CAN CHANGE IT TO A OFFICE VISIT FOLLOWUP.

## 2022-11-16 NOTE — TELEPHONE ENCOUNTER
We have other patients who see us and ob/gyn both for yearly visits and it is not usually a problem for insurance. She can check to see if it will be an issue. If it is a problem, I can see her as an office visit.

## 2022-12-27 RX ORDER — SOD SULF/POT CHLORIDE/MAG SULF 1.479 G
12 TABLET ORAL ONCE
Qty: 24 TABLET | Refills: 0 | Status: SHIPPED | OUTPATIENT
Start: 2022-12-27 | End: 2022-12-27

## 2023-01-06 ENCOUNTER — OUTSIDE FACILITY SERVICE (OUTPATIENT)
Dept: GASTROENTEROLOGY | Facility: CLINIC | Age: 58
End: 2023-01-06
Payer: COMMERCIAL

## 2023-01-06 PROCEDURE — 88305 TISSUE EXAM BY PATHOLOGIST: CPT

## 2023-01-06 PROCEDURE — 45385 COLONOSCOPY W/LESION REMOVAL: CPT | Performed by: INTERNAL MEDICINE

## 2023-01-09 ENCOUNTER — LAB REQUISITION (OUTPATIENT)
Dept: LAB | Facility: HOSPITAL | Age: 58
End: 2023-01-09
Payer: COMMERCIAL

## 2023-01-09 DIAGNOSIS — Z86.010 PERSONAL HISTORY OF COLONIC POLYPS: ICD-10-CM

## 2023-01-09 DIAGNOSIS — Z12.11 ENCOUNTER FOR SCREENING FOR MALIGNANT NEOPLASM OF COLON: ICD-10-CM

## 2023-01-09 DIAGNOSIS — D12.5 BENIGN NEOPLASM OF SIGMOID COLON: ICD-10-CM

## 2023-01-09 DIAGNOSIS — K57.30 DIVERTICULOSIS OF LARGE INTESTINE WITHOUT PERFORATION OR ABSCESS WITHOUT BLEEDING: ICD-10-CM

## 2023-01-10 LAB — REF LAB TEST METHOD: NORMAL

## 2023-08-29 ENCOUNTER — TRANSCRIBE ORDERS (OUTPATIENT)
Dept: ADMINISTRATIVE | Facility: HOSPITAL | Age: 58
End: 2023-08-29
Payer: COMMERCIAL

## 2023-08-29 DIAGNOSIS — Z12.31 VISIT FOR SCREENING MAMMOGRAM: Primary | ICD-10-CM

## 2023-09-22 ENCOUNTER — OFFICE VISIT (OUTPATIENT)
Dept: GASTROENTEROLOGY | Facility: CLINIC | Age: 58
End: 2023-09-22
Payer: COMMERCIAL

## 2023-09-22 VITALS
BODY MASS INDEX: 32.25 KG/M2 | HEIGHT: 59 IN | SYSTOLIC BLOOD PRESSURE: 128 MMHG | HEART RATE: 75 BPM | DIASTOLIC BLOOD PRESSURE: 70 MMHG | WEIGHT: 160 LBS

## 2023-09-22 DIAGNOSIS — Z86.010 HISTORY OF COLONIC POLYPS: ICD-10-CM

## 2023-09-22 DIAGNOSIS — K59.04 CHRONIC IDIOPATHIC CONSTIPATION: Primary | ICD-10-CM

## 2023-09-22 DIAGNOSIS — R10.13 EPIGASTRIC PAIN: ICD-10-CM

## 2023-09-22 DIAGNOSIS — E66.9 OBESITY (BMI 30-39.9): ICD-10-CM

## 2023-09-22 PROBLEM — L43.9 LICHEN PLANUS: Status: ACTIVE | Noted: 2022-02-09

## 2023-09-22 RX ORDER — PHENOL 1.4 %
600 AEROSOL, SPRAY (ML) MUCOUS MEMBRANE DAILY
COMMUNITY

## 2023-09-22 RX ORDER — SEMAGLUTIDE 1.7 MG/.75ML
INJECTION, SOLUTION SUBCUTANEOUS
COMMUNITY
Start: 2023-08-21

## 2023-09-22 NOTE — PROGRESS NOTES
GASTROENTEROLOGY OFFICE NOTE    Adriana Diamond  [id number]  1965    CARE TEAM  Patient Care Team:  Judith Negron PA as PCP - General (Internal Medicine)    Referring Provider: Yani Steiner APRN    Chief Complaint   Patient presents with    Abdominal Pain     Intermittently.     Elevated Hepatic Enzymes    Constipation        HISTORY OF PRESENT ILLNESS:   Adriana Diamond is a 57 y.o. female who presents to the office as a referral from Yani DAI due to abdominal pain, elevated liver enzymes.    1/9/2023 colonoscopy with documentation by Dr. Chambers the patient has remote history of adenomatous polyps, prior colonoscopy in 2017, prior colonoscopy in 2012 Dr. Recinos.  5 to 6 mm polyp removed from the sigmoid colon but no other abnormalities, recommendation to repeat colonoscopy in 5 years if polyp is adenomatous polyp.  Review of pathology report revealed adenomatous polyp/tubular adenoma.    Review of record reveals liver lesion on imaging, report not available at time of appointment.     Review of record reveals elevated liver enzymes since 2015.  She has recently lost 17 pounds (started Ozempic in June and more recently taking Wegovy) with report of normal liver enzymes when checked 2 months ago (most recent labs unavailable at this appointment).  She reports prior diagnosis of fatty liver.  Her sister also has diagnosis of fatty liver.    She reports intermittent epigastric abdominal pain radiating to her back for approximately 1 year (prior to starting Ozempic followed by Wegovy).  Applying pressure to the epigastric area seems helpful, at times standing up seems helpful.  She reports that she often does not want to eat due to pain.  She reports feeling as though she needs to lie down due to pain.  She describes a sensation of feeling compressed together in the epigastric area and an intermittent cold sensation running down the middle part of her body.    She has been on proton pump  inhibitor for long-term.  She previously took omeprazole but due to feeling as though omeprazole lost effect she changed to Zegerid.  She denies possible heartburn, reflux taking Zegerid daily.    She takes Aleve as needed for pain.  She reports seldom use of Aleve.    Of note she had negative hepatitis C test in 2019.    She reports occasionally drinking alcohol, 1 drink approximately 2 days/week.    She has history of taking fiber, MiraLAX for possible constipation.  She more recently started a stool softener.  She has a bowel movement daily or every few days.  Eating lettuce seems helpful to have a bowel movement.      Past Medical History:   Diagnosis Date    Acute sinusitis     Allergic reaction     Arthritis     Cheilosis     Contact dermatitis     Encounter for preventive health examination     Herpes simplex type 1 infection     Take valtrex as directed    Onychomycosis     Trial of lmisil ointment as perscribed. She will try for 3-4 weeks, if no improvement can try oral meds    Oral thrush     Stomatitis     Unintended weight gain     check tsh        Past Surgical History:   Procedure Laterality Date    BREAST BIOPSY Left 2008    stereo    COLONOSCOPY  2023    FRACTURE SURGERY  2018        Current Outpatient Medications on File Prior to Visit   Medication Sig    melatonin 5 MG tablet tablet Take 1 tablet by mouth.    Multiple Vitamins-Minerals (MULTI-VITAMIN GUMMIES) chewable tablet Chew daily.    Omeprazole-Sodium Bicarbonate (ZEGERID PO) Take  by mouth.    pravastatin (PRAVACHOL) 80 MG tablet TAKE 1 TABLET DAILY    valACYclovir (VALTREX) 500 MG tablet Take 1 tablet by mouth Daily. Take 1 tablet by mouth 1 (One) Time for 1 dose. Take at onset of symptoms, repeat as needed.    Wegovy 1.7 MG/0.75ML solution auto-injector INJECT 1.7mg (1pen) SUBCUTANEOUSLY ONCE WEEKLY FOR 4 WEEKS +++[facility] FOR [facility]FILLS+++    calcium carbonate (OS-CHER) 600 MG tablet Take 1 tablet by mouth Daily.     "[DISCONTINUED] alendronate (Fosamax) 70 MG tablet Take 1 tablet by mouth Every 7 (Seven) Days. (Patient not taking: Reported on 9/22/2023)    [DISCONTINUED] halobetasol (ULTRAVATE) 0.05 % ointment  (Patient not taking: Reported on 9/22/2023)    [DISCONTINUED] Zolpidem Tartrate (AMBIEN PO) Take  by mouth. (Patient not taking: Reported on 9/22/2023)     No current facility-administered medications on file prior to visit.       Allergies   Allergen Reactions    Bactrim [Sulfamethoxazole-Trimethoprim]        Family History   Problem Relation Age of Onset    Hypertension Mother     Diabetes Father     Stroke Sister     Breast cancer Neg Hx     Ovarian cancer Neg Hx        Social History     Socioeconomic History    Marital status:    Tobacco Use    Smoking status: Never    Smokeless tobacco: Never   Vaping Use    Vaping Use: Never used   Substance and Sexual Activity    Alcohol use: Yes     Comment: social    Drug use: No    Sexual activity: Defer       PHYSICAL EXAM   /70 (BP Location: Left arm, Patient Position: Sitting, Cuff Size: Adult)   Pulse 75   Ht 149.9 cm (59\")   Wt 72.6 kg (160 lb)   BMI 32.32 kg/m²   Physical Exam  Constitutional:       General: She is not in acute distress.     Appearance: She is not toxic-appearing.   HENT:      Head: Normocephalic and atraumatic. No contusion.      Right Ear: External ear normal.      Left Ear: External ear normal.   Eyes:      General: Lids are normal. No scleral icterus.        Right eye: No discharge.         Left eye: No discharge.      Extraocular Movements: Extraocular movements intact.   Neck:      Trachea: Trachea normal.      Comments: No visible mass  No visible adenopathy  Cardiovascular:      Rate and Rhythm: Normal rate.   Pulmonary:      Effort: No respiratory distress.      Comments: Symmetrical expansion    Abdominal:      Palpations: Abdomen is soft. There is no mass.      Tenderness: There is no abdominal tenderness.      Comments: " Negative Crowder's sign   Musculoskeletal:      Right lower leg: No edema.      Left lower leg: No edema.      Comments: Symmetrical movement of upper extremities  Symmetrical movement of lower extremities  No visible deformities   Skin:     General: Skin is warm and dry.      Coloration: Skin is not jaundiced.   Neurological:      General: No focal deficit present.      Mental Status: She is alert and oriented to person, place, and time.   Psychiatric:         Mood and Affect: Mood normal.         Behavior: Behavior normal.         Thought Content: Thought content normal.       Results Review:  1/9/2023 colonoscopy with documentation by Dr. Chambers the patient has remote history of adenomatous polyps, prior colonoscopy in 2017, prior colonoscopy in 2012 Dr. Recinos.  5 to 6 mm polyp removed from the sigmoid colon but no other abnormalities, recommendation to repeat colonoscopy in 5 years if polyp is adenomatous polyp.  Review of pathology report revealed adenomatous polyp/tubular adenoma.              Component  Ref Range & Units 1 yr ago  (6/24/22) 2 yr ago  (6/22/21) 3 yr ago  (5/11/20) 3 yr ago  (5/11/20) 3 yr ago  (5/11/20) 4 yr ago  (5/17/19) 5 yr ago  (8/21/18)   Glucose  65 - 99 mg/dL 116 High  122 High     117 High  104 High  R   BUN  6 - 20 mg/dL 12 12    11 18 R   Creatinine  0.57 - 1.00 mg/dL 0.88 0.67 0.63   0.67 0.77 R   Sodium  136 - 145 mmol/L 143 140    144 142 R   Potassium  3.5 - 5.2 mmol/L 5.3 High  4.8    4.6 4.4 R   Chloride  98 - 107 mmol/L 104 105    105 104 R   CO2  22.0 - 29.0 mmol/L 27.3 22.3    25.2 31.0 R   Calcium  8.6 - 10.5 mg/dL 9.8 9.2  9.9  10.0 9.2 R   Total Protein  6.0 - 8.5 g/dL 7.2 6.7   7.2 7.1    Albumin  3.50 - 5.20 g/dL 4.90 4.60   4.70 4.30    ALT (SGPT)  1 - 33 U/L 86 High  75 High    101 High  60 High     AST (SGOT)  1 - 32 U/L 65 High  47 High    57 High  35 High     Alkaline Phosphatase  39 - 117 U/L 104 104   111 107    Total Bilirubin  0.0 - 1.2 mg/dL 0.6 0.4   0.5 R  0.4 R    Globulin  gm/dL 2.3 2.1    2.8    A/G Ratio  g/dL 2.1 2.2    1.5    BUN/Creatinine Ratio  7.0 - 25.0 13.6 17.9    16.4 23.4   Anion Gap  5.0 - 15.0 mmol/L 11.7 12.7    13.8 R 7.0 R   eGFR  >60.0 mL/min/1.73 76.8             ASSESSMENT / PLAN  1. Chronic idiopathic constipation  -Constipation possibly contributing to abdominal pain, currently on stool softener, history of taking MiraLAX and/or fiber.  Recommend she take Linzess 72 mcg daily to determine if upper GI symptoms improve  - linaclotide (Linzess) 72 MCG capsule capsule; Take 1 capsule by mouth Daily. 30 minutes prior to a meal  Dispense: 90 capsule; Refill: 3    2. Epigastric pain  -Plan for EGD for additional evaluation  -Try to avoid NSAIDs, consider acetaminophen as needed  -Epigastric pain started prior to initiating Ozempic followed by Wegovy but I did express concern about medications contributing to gastroparesis and upper GI symptoms.  Printed information regarding gastroparesis provided in the office today  -Constipation possibly contributing, trial of Linzess as above  3. History of  Abnormal liver enzymes  4. Obesity  -She reports most recent lab test revealed normal liver enzymes.  The office will attempt to obtain the report.  Suspect likely fatty liver with 17 pound weight loss being helpful for elevated liver enzymes and what seems to be now normal  liver enzymes  -If she was found to have normal liver enzymes recommend checking liver enzymes every 6 months to 1 year and trying to avoid weight gain, additional weight loss may be helpful    5. History of colonic polyps  -Due for surveillance colonoscopy 1/2028    Return for Follow-up after EGD.    Kendra Haywood, APRN  09/22/2023

## 2023-09-26 ENCOUNTER — TELEPHONE (OUTPATIENT)
Dept: GASTROENTEROLOGY | Facility: CLINIC | Age: 58
End: 2023-09-26

## 2023-09-26 NOTE — TELEPHONE ENCOUNTER
NEW I.D. AND INS CARDS ALONG WITH  [employer] WAS LAYING ON FRONT COUNTER. I ADDED INS AND I.D. TO CHART ALSO SCANNED IN .  THERE WAS ALSO A AUTHORIZATION TO OBTAIN HEALTH INFORMATION  TO [facility] NOT SURE WHERE TO SEND IT.

## 2023-10-05 ENCOUNTER — HOSPITAL ENCOUNTER (OUTPATIENT)
Dept: MAMMOGRAPHY | Facility: HOSPITAL | Age: 58
Discharge: HOME | End: 2023-10-05
Admitting: OBSTETRICS & GYNECOLOGY
Payer: COMMERCIAL

## 2023-10-05 DIAGNOSIS — Z12.31 VISIT FOR SCREENING MAMMOGRAM: ICD-10-CM

## 2023-10-05 PROCEDURE — 77063 BREAST TOMOSYNTHESIS BI: CPT

## 2023-10-05 PROCEDURE — 77067 SCR MAMMO BI INCL CAD: CPT

## 2023-11-14 DIAGNOSIS — R74.01 ELEVATED ALT MEASUREMENT: Primary | ICD-10-CM

## 2023-12-01 ENCOUNTER — OUTSIDE FACILITY SERVICE (OUTPATIENT)
Dept: GASTROENTEROLOGY | Facility: CLINIC | Age: 58
End: 2023-12-01
Payer: COMMERCIAL

## 2023-12-01 PROCEDURE — 43239 EGD BIOPSY SINGLE/MULTIPLE: CPT | Performed by: INTERNAL MEDICINE

## 2023-12-01 PROCEDURE — 88305 TISSUE EXAM BY PATHOLOGIST: CPT

## 2023-12-01 RX ORDER — PANTOPRAZOLE SODIUM 40 MG/1
40 TABLET, DELAYED RELEASE ORAL 2 TIMES DAILY
Qty: 60 TABLET | Refills: 11 | Status: SHIPPED | OUTPATIENT
Start: 2023-12-01

## 2023-12-04 ENCOUNTER — LAB REQUISITION (OUTPATIENT)
Dept: LAB | Facility: HOSPITAL | Age: 58
End: 2023-12-04
Payer: COMMERCIAL

## 2023-12-04 DIAGNOSIS — K22.89 OTHER SPECIFIED DISEASE OF ESOPHAGUS: ICD-10-CM

## 2023-12-04 DIAGNOSIS — K44.9 DIAPHRAGMATIC HERNIA WITHOUT OBSTRUCTION OR GANGRENE: ICD-10-CM

## 2023-12-04 DIAGNOSIS — R10.13 EPIGASTRIC PAIN: ICD-10-CM

## 2023-12-05 LAB — REF LAB TEST METHOD: NORMAL

## 2024-01-23 ENCOUNTER — OFFICE VISIT (OUTPATIENT)
Dept: GASTROENTEROLOGY | Facility: CLINIC | Age: 59
End: 2024-01-23
Payer: COMMERCIAL

## 2024-01-23 VITALS
HEIGHT: 60 IN | SYSTOLIC BLOOD PRESSURE: 125 MMHG | BODY MASS INDEX: 29.02 KG/M2 | WEIGHT: 147.8 LBS | DIASTOLIC BLOOD PRESSURE: 98 MMHG

## 2024-01-23 DIAGNOSIS — K59.04 CHRONIC IDIOPATHIC CONSTIPATION: ICD-10-CM

## 2024-01-23 DIAGNOSIS — Z86.010 HISTORY OF COLONIC POLYPS: ICD-10-CM

## 2024-01-23 DIAGNOSIS — R74.01 ELEVATED ALT MEASUREMENT: Primary | ICD-10-CM

## 2024-01-23 DIAGNOSIS — R10.13 EPIGASTRIC PAIN: ICD-10-CM

## 2024-01-23 DIAGNOSIS — K31.84 GASTROPARESIS: ICD-10-CM

## 2024-01-23 PROCEDURE — 99214 OFFICE O/P EST MOD 30 MIN: CPT | Performed by: INTERNAL MEDICINE

## 2024-01-23 RX ORDER — METOCLOPRAMIDE HYDROCHLORIDE 15 MG/.07ML
1 SPRAY NASAL
Qty: 112 ML | Refills: 6 | Status: SHIPPED | OUTPATIENT
Start: 2024-01-23

## 2024-01-23 RX ORDER — LEVOFLOXACIN 500 MG/1
500 TABLET, FILM COATED ORAL DAILY
COMMUNITY
Start: 2024-01-18

## 2024-01-23 NOTE — PROGRESS NOTES
GASTROENTEROLOGY OFFICE NOTE  Adriana Diamond  [id number]  1965      Chief Complaint   Patient presents with    Abdominal Pain    Gastroparesis        HISTORY OF PRESENT ILLNESS:  Patient presents for follow-up after EGD December 1, 2023 was remarkable for a large amount of retained food in the stomach after confirmed overnight fast consistent with gastroparesis.  She continues to have upper abdominal discomfort which is worse postprandially.    She denies dysphagia, odynophagia.  She has some element of early satiety but no unexplained weight loss and denies melena/bright red blood per rectum.    She is status post colonoscopy January 2023 at which time adenomatous colonic polyps were removed for which a 5-year surveillance interval was recommended i.e. January 2028.    She has a history of elevated serum liver enzymes.  Presumably related to metabolic associated fatty liver disease.  Last liver enzymes available for review in epic are an AST and ALT 65 and 86 units/unit respectively dated June 2022.  She has lost 13 pounds since her appointment in September 2023 which her sugar, APRN esophagus.    She is chronically constipated.  This continues to be problematic for her.    PAST MEDICAL HISTORY  Past Medical History:    Acute sinusitis    Allergic reaction    Arthritis    Cheilosis    Contact dermatitis    Encounter for preventive health examination    Health care maintenance    Herpes simplex type 1 infection    Take valtrex as directed    Hyperlipidemia    Onychomycosis    Trial of lmisil ointment as perscribed. She will try for 3-4 weeks, if no improvement can try oral meds    Oral thrush    Stomatitis    Unintended weight gain    check tsh        PAST SURGICAL HISTORY  Past Surgical History:    BREAST BIOPSY    stereo    COLONOSCOPY    FRACTURE SURGERY    UPPER GASTROINTESTINAL ENDOSCOPY        MEDICATIONS:    Current Outpatient Medications:     levoFLOXacin (LEVAQUIN) 500 MG tablet, Take 1 tablet  "by mouth Daily., Disp: , Rfl:     linaclotide (Linzess) 72 MCG capsule capsule, Take 1 capsule by mouth Daily. 30 minutes prior to a meal, Disp: 90 capsule, Rfl: 3    melatonin 5 MG tablet tablet, Take 1 tablet by mouth., Disp: , Rfl:     Multiple Vitamins-Minerals (MULTI-VITAMIN GUMMIES) chewable tablet, Chew daily., Disp: , Rfl:     pantoprazole (PROTONIX) 40 MG EC tablet, Take 1 tablet by mouth 2 (Two) Times a Day., Disp: 60 tablet, Rfl: 11    pravastatin (PRAVACHOL) 80 MG tablet, TAKE 1 TABLET DAILY, Disp: 90 tablet, Rfl: 3    Wegovy 1.7 MG/0.75ML solution auto-injector, INJECT 1.7mg (1pen) SUBCUTANEOUSLY ONCE WEEKLY FOR 4 WEEKS +++[facility] FOR [facility]FILLS+++, Disp: , Rfl:     Metoclopramide HCl (Gimoti) 15 MG/ACT solution, 1 spray into the nostril(s) as directed by provider 3 (Three) Times a Day Before Meals., Disp: 112 mL, Rfl: 6    ALLERGIES  is allergic to bactrim [sulfamethoxazole-trimethoprim].    FAMILY HISTORY:  Cancer-related family history is negative for Breast cancer and Ovarian cancer.  Colon Cancer-related family history is not on file.    SOCIAL HISTORY  She  reports that she has never smoked. She has never used smokeless tobacco. She reports current alcohol use. She reports that she does not use drugs.       PHYSICAL EXAM   /98 (BP Location: Left arm, Patient Position: Sitting, Cuff Size: Adult)   Ht 152.4 cm (60\")   Wt 67 kg (147 lb 12.8 oz)   BMI 28.87 kg/m²   General: Pleasant, no apparent acute distress.  Alert and oriented.  HEENT: Anicteric sclera  Lungs: Grossly normal respiration without labored breathing or audible wheezing noted.  Speaking in full sentences  Abdomen: Without gross or obvious distention  Neurologic: Normal cognition and affect.  Alert and oriented      ASSESSMENT  1.-Gastroparesis likely contributing to her dyspeptic symptoms and her irritable bowel type complaints.  We talked about dietary modifications which she seems to have already implemented " fairly well.  We talked about pharmacological therapy including metoclopramide (we reviewed the currently stated risk of tardive dyskinesia which is about 1 per every 1000 patient years), domperidone, cisapride, erythromycin and also reviewed interventions such as pyloroplasty, per oral endoscopic myotomy, pyloric Botox.  Conservative management for now with intranasal metoclopramide as well we agreed to and she will approach her registered dietitian to get more guidance on a diet for gastroparesis  2.-Elevated liver enzymes.  Presumably due to metabolic associated fatty liver disease.  Fortunately she has had some weight loss she was last with the esophagus.  We will simply recheck her liver enzymes  3.-Irritable bowel type complaints    PLAN  1.-Intranasal metoclopramide approximately 10 mg 3 times daily  2.-Return appointment 3 months  3.-Consider domperidone.  Consider cisapride  4.-Recheck serum liver enzymes  5.-Return appointment  6.-Patient to obtain further guidance regarding gastroparesis friendly diet with a registered dietitian      Gerson Chambers MD  1/24/2024   13:46 EST

## 2024-01-24 PROBLEM — R10.13 EPIGASTRIC PAIN: Status: ACTIVE | Noted: 2024-01-24

## 2024-01-24 PROBLEM — K31.84 GASTROPARESIS: Status: ACTIVE | Noted: 2024-01-24

## 2024-01-24 PROBLEM — Z86.010 HISTORY OF COLONIC POLYPS: Status: ACTIVE | Noted: 2024-01-24

## 2024-01-24 PROBLEM — R74.01 ELEVATED ALT MEASUREMENT: Status: ACTIVE | Noted: 2024-01-24

## 2024-01-24 PROBLEM — Z86.0100 HISTORY OF COLONIC POLYPS: Status: ACTIVE | Noted: 2024-01-24

## 2024-01-24 PROBLEM — K59.04 CHRONIC IDIOPATHIC CONSTIPATION: Status: ACTIVE | Noted: 2024-01-24

## 2024-02-05 ENCOUNTER — PRIOR AUTHORIZATION (OUTPATIENT)
Dept: GASTROENTEROLOGY | Facility: CLINIC | Age: 59
End: 2024-02-05
Payer: COMMERCIAL

## 2024-02-09 RX ORDER — PANTOPRAZOLE SODIUM 40 MG/1
40 TABLET, DELAYED RELEASE ORAL 2 TIMES DAILY
Qty: 180 TABLET | Refills: 3 | Status: SHIPPED | OUTPATIENT
Start: 2024-02-09

## 2024-02-10 ENCOUNTER — PATIENT MESSAGE (OUTPATIENT)
Dept: GASTROENTEROLOGY | Facility: CLINIC | Age: 59
End: 2024-02-10
Payer: COMMERCIAL

## 2024-02-10 DIAGNOSIS — K59.04 CHRONIC IDIOPATHIC CONSTIPATION: ICD-10-CM

## 2024-04-01 ENCOUNTER — LAB (OUTPATIENT)
Dept: LAB | Facility: HOSPITAL | Age: 59
End: 2024-04-01
Payer: COMMERCIAL

## 2024-04-01 ENCOUNTER — TRANSCRIBE ORDERS (OUTPATIENT)
Dept: LAB | Facility: HOSPITAL | Age: 59
End: 2024-04-01
Payer: COMMERCIAL

## 2024-04-01 DIAGNOSIS — R74.01 ELEVATED ALT MEASUREMENT: ICD-10-CM

## 2024-04-01 DIAGNOSIS — Z76.89 MENSTRUAL EXTRACTION: ICD-10-CM

## 2024-04-01 DIAGNOSIS — Z76.89 MENSTRUAL EXTRACTION: Primary | ICD-10-CM

## 2024-04-01 LAB
ALBUMIN SERPL-MCNC: 4.4 G/DL (ref 3.5–5.2)
ALBUMIN/GLOB SERPL: 1.8 G/DL
ALP SERPL-CCNC: 91 U/L (ref 39–117)
ALT SERPL W P-5'-P-CCNC: 28 U/L (ref 1–33)
ANION GAP SERPL CALCULATED.3IONS-SCNC: 12.8 MMOL/L (ref 5–15)
AST SERPL-CCNC: 23 U/L (ref 1–32)
BASOPHILS # BLD AUTO: 0.08 10*3/MM3 (ref 0–0.2)
BASOPHILS NFR BLD AUTO: 1.2 % (ref 0–1.5)
BILIRUB CONJ SERPL-MCNC: <0.2 MG/DL (ref 0–0.3)
BILIRUB SERPL-MCNC: 0.5 MG/DL (ref 0–1.2)
BUN SERPL-MCNC: 12 MG/DL (ref 6–20)
BUN/CREAT SERPL: 18.5 (ref 7–25)
CALCIUM SPEC-SCNC: 9.8 MG/DL (ref 8.6–10.5)
CHLORIDE SERPL-SCNC: 103 MMOL/L (ref 98–107)
CHOLEST SERPL-MCNC: 173 MG/DL (ref 0–200)
CO2 SERPL-SCNC: 25.2 MMOL/L (ref 22–29)
CREAT SERPL-MCNC: 0.65 MG/DL (ref 0.57–1)
DEPRECATED RDW RBC AUTO: 41.8 FL (ref 37–54)
EGFRCR SERPLBLD CKD-EPI 2021: 101.6 ML/MIN/1.73
EOSINOPHIL # BLD AUTO: 0.18 10*3/MM3 (ref 0–0.4)
EOSINOPHIL NFR BLD AUTO: 2.7 % (ref 0.3–6.2)
ERYTHROCYTE [DISTWIDTH] IN BLOOD BY AUTOMATED COUNT: 13 % (ref 12.3–15.4)
GLOBULIN UR ELPH-MCNC: 2.5 GM/DL
GLUCOSE SERPL-MCNC: 93 MG/DL (ref 65–99)
HCT VFR BLD AUTO: 42 % (ref 34–46.6)
HDLC SERPL-MCNC: 61 MG/DL (ref 40–60)
HGB BLD-MCNC: 13.9 G/DL (ref 12–15.9)
IMM GRANULOCYTES # BLD AUTO: 0.01 10*3/MM3 (ref 0–0.05)
IMM GRANULOCYTES NFR BLD AUTO: 0.2 % (ref 0–0.5)
LDLC SERPL CALC-MCNC: 91 MG/DL (ref 0–100)
LDLC/HDLC SERPL: 1.44 {RATIO}
LYMPHOCYTES # BLD AUTO: 1.19 10*3/MM3 (ref 0.7–3.1)
LYMPHOCYTES NFR BLD AUTO: 18 % (ref 19.6–45.3)
MCH RBC QN AUTO: 29.3 PG (ref 26.6–33)
MCHC RBC AUTO-ENTMCNC: 33.1 G/DL (ref 31.5–35.7)
MCV RBC AUTO: 88.4 FL (ref 79–97)
MONOCYTES # BLD AUTO: 0.66 10*3/MM3 (ref 0.1–0.9)
MONOCYTES NFR BLD AUTO: 10 % (ref 5–12)
NEUTROPHILS NFR BLD AUTO: 4.49 10*3/MM3 (ref 1.7–7)
NEUTROPHILS NFR BLD AUTO: 67.9 % (ref 42.7–76)
NRBC BLD AUTO-RTO: 0 /100 WBC (ref 0–0.2)
PLATELET # BLD AUTO: 166 10*3/MM3 (ref 140–450)
PMV BLD AUTO: 11.5 FL (ref 6–12)
POTASSIUM SERPL-SCNC: 4.6 MMOL/L (ref 3.5–5.2)
PROT SERPL-MCNC: 6.9 G/DL (ref 6–8.5)
RBC # BLD AUTO: 4.75 10*6/MM3 (ref 3.77–5.28)
SODIUM SERPL-SCNC: 141 MMOL/L (ref 136–145)
TRIGL SERPL-MCNC: 121 MG/DL (ref 0–150)
TSH SERPL DL<=0.05 MIU/L-ACNC: 2.7 UIU/ML (ref 0.27–4.2)
VLDLC SERPL-MCNC: 21 MG/DL (ref 5–40)
WBC NRBC COR # BLD AUTO: 6.61 10*3/MM3 (ref 3.4–10.8)

## 2024-04-01 PROCEDURE — 36415 COLL VENOUS BLD VENIPUNCTURE: CPT

## 2024-04-01 PROCEDURE — 80050 GENERAL HEALTH PANEL: CPT

## 2024-04-01 PROCEDURE — 80061 LIPID PANEL: CPT

## 2024-04-01 PROCEDURE — 82248 BILIRUBIN DIRECT: CPT

## 2024-04-02 NOTE — PROGRESS NOTES
Patient with history of elevated serum liver enzymes presumably due to metabolic associated fatty liver disease.  Recent weight loss noted.  Serum liver enzymes have normalized.  3 years ago AST and ALT were 57 and 101 units/L respectively.  2 years ago 47 and 75 units respectively in 1 year ago 65 and 86 units/L.  Most recent serum liver enzymes are now normal with an AST of 23 units/L and an ALT of 28 units/L.  Recommendation is to continue to monitor.

## 2024-08-28 ENCOUNTER — OFFICE VISIT (OUTPATIENT)
Dept: GASTROENTEROLOGY | Facility: CLINIC | Age: 59
End: 2024-08-28
Payer: COMMERCIAL

## 2024-08-28 VITALS
SYSTOLIC BLOOD PRESSURE: 137 MMHG | HEART RATE: 71 BPM | WEIGHT: 133.4 LBS | DIASTOLIC BLOOD PRESSURE: 77 MMHG | BODY MASS INDEX: 26.19 KG/M2 | HEIGHT: 60 IN

## 2024-08-28 DIAGNOSIS — R74.01 ELEVATED ALT MEASUREMENT: ICD-10-CM

## 2024-08-28 DIAGNOSIS — K59.04 CHRONIC IDIOPATHIC CONSTIPATION: ICD-10-CM

## 2024-08-28 DIAGNOSIS — K31.84 GASTROPARESIS: Primary | ICD-10-CM

## 2024-08-28 DIAGNOSIS — Z86.010 HISTORY OF COLONIC POLYPS: ICD-10-CM

## 2024-08-28 PROCEDURE — 99214 OFFICE O/P EST MOD 30 MIN: CPT | Performed by: INTERNAL MEDICINE

## 2024-08-28 NOTE — PROGRESS NOTES
"GASTROENTEROLOGY OFFICE NOTE  Adriana Diamond  [id number]  1965      Chief Complaint   Patient presents with    Abdominal Pain        HISTORY OF PRESENT ILLNESS:  58-year-old white female again presents with upper abdominal pain in the epigastrium unrelated to times a day, activities food intake or any specific position or movement.  It occurs \"some days \".  Intense last less than an hour and is moderate in intensity seems to radiate towards her back.  It is not progressive    Symptoms are not accompanied by dysphagia, odynophagia, early satiety, unexplained weight loss, melena or bright red blood per rectum.    She does take Linzess 72 mcg daily for her diarrhea and is on pantoprazole 40 mg p.o. twice daily for chronic esophageal reflux disease.    She has had elevated her liver enzymes presumably due to metabolic associated fatty liver disease.  These have normalized following weight loss.  Her most recent serum liver enzymes are from April 2024 and are notable for an AST at 23 units/L and ALT 28 units/L down from an AST of 65 and ALT of 86 in June 2022..    She is felt to have gastroparesis.  She had a large amount of retained food on her December 2023 EGD after confirmed overnight fast.    She has a history of adenomatous colon polyps.  Last colonoscopy in 2023 was positive for adenomas.  A 5-year surveillance interval was recommended.    Incidental cystic lesion in the left lobe of the liver is noted.  This is from a ultrasound dated July 18, 2023.  This within the left hepatic lobe is 0.5 cm in size and is consistent with a cyst.  Pancreas was not well-visualized but was grossly normal.    PAST MEDICAL HISTORY  Past Medical History:    Acute sinusitis    Allergic reaction    Arthritis    Cheilosis    Contact dermatitis    Encounter for preventive health examination    Health care maintenance    Herpes simplex type 1 infection    Take valtrex as directed    Hyperlipidemia    Onychomycosis    Trial " of lmisil ointment as perscribed. She will try for 3-4 weeks, if no improvement can try oral meds    Oral thrush    Stomatitis    Unintended weight gain    check tsh        PAST SURGICAL HISTORY  Past Surgical History:    BREAST BIOPSY    stereo    COLONOSCOPY    FRACTURE SURGERY    UPPER GASTROINTESTINAL ENDOSCOPY        MEDICATIONS:    Current Outpatient Medications:     linaclotide (Linzess) 72 MCG capsule capsule, Take 1 capsule by mouth Daily. 30 minutes prior to a meal, Disp: 90 capsule, Rfl: 3    melatonin 5 MG tablet tablet, Take 1 tablet by mouth., Disp: , Rfl:     Multiple Vitamins-Minerals (MULTI-VITAMIN GUMMIES) chewable tablet, Chew daily., Disp: , Rfl:     pantoprazole (PROTONIX) 40 MG EC tablet, Take 1 tablet by mouth 2 (Two) Times a Day., Disp: 180 tablet, Rfl: 3    Wegovy 1.7 MG/0.75ML solution auto-injector, INJECT 1.7mg (1pen) SUBCUTANEOUSLY ONCE WEEKLY FOR 4 WEEKS +++[facility]CT [facility] FOR REFILLS+++, Disp: , Rfl:     Metoclopramide HCl (Gimoti) 15 MG/ACT solution, 1 spray into the nostril(s) as directed by provider 3 (Three) Times a Day Before Meals. (Patient not taking: Reported on 8/28/2024), Disp: 112 mL, Rfl: 6    ALLERGIES  is allergic to bactrim [sulfamethoxazole-trimethoprim].    FAMILY HISTORY:  Cancer-related family history is negative for Breast cancer and Ovarian cancer.  Colon Cancer-related family history is not on file.    SOCIAL HISTORY  She  reports that she has never smoked. She has never used smokeless tobacco. She reports current alcohol use. She reports that she does not use drugs.   .  She does not have any children.  She works for [employer]    PHYSICAL EXAM   There were no vitals taken for this visit.  General: Alert and oriented x 3. In no apparent or acute distress.  and No stigmata of chronic liver disease  HEENT: Anicteric sclerae. Normal oropharynx  Neck: Supple. Without lymphadenopathy  CV: Regular rate and rhythm, S1, S2  Lungs: Clear to ausculation.  Without rales, rhonchi and wheezing  Abdomen: Area of reproducible tenderness in the epigastrium is noted consistent with a trigger point.  Abdomen is soft with normal bowel sounds no palpable masses or hepatosplenomegaly.  Extremeties: without clubbing, cyanosis or edema  Neurologic:  Alert and oriented x 3 without focal motor or sensory deficits  Rectal exam: deferred       ASSESSMENT  1.-GERD  2.-Gastroparesis.  She did not find intranasal metoclopramide to be useful  3.-Myofascial abdominal pain  4.-Patient up-to-date on her colon cancer screening.  Last colonoscopy was January 2023.  5.-History of adenomatous colon polyps.  Surveillance colonoscopy due in January 2028  6.-History of obesity.  BMI is now 26.05 kg/m²  7.-Abnormal imaging study.  Benign hepatic cyst.  Further workup or evaluation is not necessary    PLAN  1.-Trial of Elavil 12.5 to 50 mg p.o. nightly criteria due to response and tolerance.  2.-Colonoscopy due January 2028  3.-Continue dietary modification gastroparesis  4.-Monitor serum liver enzymes  5.-Return appointment      Gerson Chambers MD  8/28/2024   15:23 EDT

## 2024-12-04 ENCOUNTER — OFFICE VISIT (OUTPATIENT)
Dept: GASTROENTEROLOGY | Facility: CLINIC | Age: 59
End: 2024-12-04
Payer: COMMERCIAL

## 2024-12-04 VITALS
HEIGHT: 60 IN | SYSTOLIC BLOOD PRESSURE: 141 MMHG | WEIGHT: 130.6 LBS | DIASTOLIC BLOOD PRESSURE: 70 MMHG | BODY MASS INDEX: 25.64 KG/M2

## 2024-12-04 DIAGNOSIS — K31.84 GASTROPARESIS: Primary | ICD-10-CM

## 2024-12-04 DIAGNOSIS — Z86.0100 HISTORY OF COLONIC POLYPS: ICD-10-CM

## 2024-12-04 DIAGNOSIS — R74.01 ELEVATED ALT MEASUREMENT: ICD-10-CM

## 2024-12-04 DIAGNOSIS — K59.04 CHRONIC IDIOPATHIC CONSTIPATION: ICD-10-CM

## 2024-12-04 DIAGNOSIS — M79.18 MYOFASCIAL PAIN: ICD-10-CM

## 2024-12-04 DIAGNOSIS — R10.13 EPIGASTRIC PAIN: ICD-10-CM

## 2024-12-04 PROCEDURE — 99214 OFFICE O/P EST MOD 30 MIN: CPT | Performed by: INTERNAL MEDICINE

## 2024-12-04 NOTE — PROGRESS NOTES
GASTROENTEROLOGY OFFICE NOTE  Adriana Diamond  [id number]  1965      Chief Complaint   Patient presents with    Gastroparesis, Elevated ALT        HISTORY OF PRESENT ILLNESS:  Return visit for this 59-year-old white female who, when last seen, had myofascial pain with reliably reproducible trigger point in the upper abdomen which reproduced her presenting complaint of pain.  She was prescribed amitriptyline/Elavil.  She took 1 dose and feels that it was ineffective and did not take any other doses.    This upper abdominal pain is the primary complaint that she has today.  She is also known to have gastroparesis.  This upper abdominal pain is not particularly more reliably postprandial.  It is unassociated with dysphagia to solids or odynophagia it is unassociated with early satiety or unexplained weight loss and has not been associated with melena nor any bright red blood per rectum.    She is status post EGD December 2023 and colonoscopy January 2023    She has a history of metabolic associated fatty liver disease with elevated serum liver enzymes.  Most recent serum liver enzymes from April 2024 entirely within normal limits.  Compared to June 2022 labs, ALT went from 86 units/L down to 28 units/L and AST went from 65 units/L down to 23 units/L.  This occurred in conjunction with weight loss.    She has history of adenomatous colonic polyps and is due for 5-year surveillance colonoscopy.  Colonoscopy is recommended in January 2028.    She has an incidental hepatic cyst in the left hepatic lobe measuring 0.5 cm and consistent with a cyst.    PAST MEDICAL HISTORY  Past Medical History:    Acute sinusitis    Allergic reaction    Arthritis    Cheilosis    Contact dermatitis    Encounter for preventive health examination    Health care maintenance    Herpes simplex type 1 infection    Take valtrex as directed    Hyperlipidemia    Onychomycosis    Trial of lmisil ointment as perscribed. She will try for 3-4  "weeks, if no improvement can try oral meds    Oral thrush    Stomatitis    Unintended weight gain    check tsh        PAST SURGICAL HISTORY  Past Surgical History:    BREAST BIOPSY    stereo    COLONOSCOPY    FRACTURE SURGERY    UPPER GASTROINTESTINAL ENDOSCOPY        MEDICATIONS:    Current Outpatient Medications:     linaclotide (Linzess) 72 MCG capsule capsule, Take 1 capsule by mouth Daily. 30 minutes prior to a meal, Disp: 90 capsule, Rfl: 3    melatonin 5 MG tablet tablet, Take 1 tablet by mouth., Disp: , Rfl:     Multiple Vitamins-Minerals (MULTI-VITAMIN GUMMIES) chewable tablet, Chew daily., Disp: , Rfl:     pantoprazole (PROTONIX) 40 MG EC tablet, Take 1 tablet by mouth 2 (Two) Times a Day., Disp: 180 tablet, Rfl: 3    Wegovy 1.7 MG/0.75ML solution auto-injector, INJECT 1.7mg (1pen) SUBCUTANEOUSLY ONCE WEEKLY FOR 4 WEEKS +++[facility]CT [facility] FOR [facility]FILLS+++, Disp: , Rfl:     amitriptyline (ELAVIL) 25 MG tablet, Take 2 tablets by mouth Every Night. Start with one half tablet and increase by one half tablet nightly as tolerated up to 2 tablets at bedtime (Patient not taking: Reported on 12/4/2024), Disp: 60 tablet, Rfl: 11    Metoclopramide HCl (Gimoti) 15 MG/ACT solution, 1 spray into the nostril(s) as directed by provider 3 (Three) Times a Day Before Meals. (Patient not taking: Reported on 8/28/2024), Disp: 112 mL, Rfl: 6    ALLERGIES  is allergic to bactrim [sulfamethoxazole-trimethoprim].    FAMILY HISTORY:  Cancer-related family history is negative for Breast cancer and Ovarian cancer.  Colon Cancer-related family history is not on file.    SOCIAL HISTORY  She  reports that she has never smoked. She has never used smokeless tobacco. She reports current alcohol use. She reports that she does not use drugs.   .  She does not have any children.  She works for [employer]      PHYSICAL EXAM   /70 (BP Location: Left arm, Patient Position: Sitting, Cuff Size: Adult)   Ht 152.4 cm (60\")   Wt " 59.2 kg (130 lb 9.6 oz)   BMI 25.51 kg/m²   General: Pleasant, no apparent acute distress.  Alert and oriented.  HEENT: Anicteric sclera  Lungs: Grossly normal respiration without labored breathing or audible wheezing noted.  Speaking in full sentences  Abdomen: Reproducible trigger point in the epigastrium and various other areas of the upper abdomen.  Abdomen is soft with normal bowel sounds no palpable masses hepatosplenomegaly  Neurologic: Normal cognition and affect.  Alert and oriented      ASSESSMENT  1.-Myofascial abdominal pain.  Patient agreeable to trying amitriptyline again.  If unresponsive or intolerant of amitriptyline referral to pain clinic for trigger point injection therapy is recommended  2.-Gastroparesis.  This is not particularly problematic for her at the time despite ongoing therapy with semaglutide which of course exacerbates gastroparesis.  3.-History of adenomatous colonic polyps.  Due for surveillance colonoscopy in January 2028  4.-History of obesity.  She has had excellent response to dietary pharmacological intervention and BMI is now down to 25.51 kg/m²  5.-History of metabolic associated fatty liver disease.  Serum liver enzymes have normalized with weight loss.  6.-Chronic idiopathic constipation doing well on Linzess 72 mcg daily  7.-GERD.  Doing well on pantoprazole 40 mg p.o. twice daily    PLAN  1.-Retry Elavil 12.5 to 50 mg p.o. nightly tolerated to response and tolerance  2.-Referral to pain clinic if Elavil proves ineffective or is not tolerated  3.-Surveillance colonoscopy January 2028  4.-Return appointment  5.-Continue pantoprazole 40 mg p.o. twice daily  6.-Continue Linzess 72 mcg daily      Gerson Chambers MD  12/4/2024   16:16 EST

## 2024-12-08 PROBLEM — M79.18 MYOFASCIAL PAIN: Status: ACTIVE | Noted: 2024-12-08

## 2024-12-13 ENCOUNTER — OFFICE VISIT (OUTPATIENT)
Dept: INTERNAL MEDICINE | Facility: CLINIC | Age: 59
End: 2024-12-13
Payer: COMMERCIAL

## 2024-12-13 ENCOUNTER — TELEPHONE (OUTPATIENT)
Dept: INTERNAL MEDICINE | Facility: CLINIC | Age: 59
End: 2024-12-13

## 2024-12-13 VITALS
OXYGEN SATURATION: 97 % | WEIGHT: 132 LBS | HEART RATE: 73 BPM | DIASTOLIC BLOOD PRESSURE: 68 MMHG | SYSTOLIC BLOOD PRESSURE: 112 MMHG | HEIGHT: 60 IN | BODY MASS INDEX: 25.91 KG/M2

## 2024-12-13 DIAGNOSIS — Z12.31 ENCOUNTER FOR SCREENING MAMMOGRAM FOR BREAST CANCER: ICD-10-CM

## 2024-12-13 DIAGNOSIS — Z86.0100 HISTORY OF COLONIC POLYPS: ICD-10-CM

## 2024-12-13 DIAGNOSIS — Z76.89 ESTABLISHING CARE WITH NEW DOCTOR, ENCOUNTER FOR: ICD-10-CM

## 2024-12-13 DIAGNOSIS — Z00.00 ENCOUNTER FOR WELL ADULT EXAM WITHOUT ABNORMAL FINDINGS: ICD-10-CM

## 2024-12-13 DIAGNOSIS — Z13.29 SCREENING FOR THYROID DISORDER: ICD-10-CM

## 2024-12-13 DIAGNOSIS — Z00.00 ANNUAL PHYSICAL EXAM: Primary | ICD-10-CM

## 2024-12-13 DIAGNOSIS — M79.18 MYOFASCIAL PAIN: ICD-10-CM

## 2024-12-13 DIAGNOSIS — K57.90 DIVERTICULOSIS: ICD-10-CM

## 2024-12-13 DIAGNOSIS — Z13.1 SCREENING FOR DIABETES MELLITUS: ICD-10-CM

## 2024-12-13 DIAGNOSIS — E66.3 OVERWEIGHT (BMI 25.0-29.9): ICD-10-CM

## 2024-12-13 DIAGNOSIS — R63.5 WEIGHT GAIN: ICD-10-CM

## 2024-12-13 DIAGNOSIS — R74.01 ELEVATED ALT MEASUREMENT: ICD-10-CM

## 2024-12-13 DIAGNOSIS — Z78.9 NON-SMOKER: ICD-10-CM

## 2024-12-13 DIAGNOSIS — Z83.3 FAMILY HISTORY OF DIABETES MELLITUS: ICD-10-CM

## 2024-12-13 DIAGNOSIS — Z78.0 POSTMENOPAUSAL: ICD-10-CM

## 2024-12-13 DIAGNOSIS — Z13.220 SCREENING FOR LIPID DISORDERS: ICD-10-CM

## 2024-12-13 DIAGNOSIS — Z28.21 IMMUNIZATION DECLINED: ICD-10-CM

## 2024-12-13 DIAGNOSIS — K31.84 GASTROPARESIS: ICD-10-CM

## 2024-12-13 PROBLEM — B35.1 ONYCHOMYCOSIS: Status: RESOLVED | Noted: 2017-01-31 | Resolved: 2024-12-13

## 2024-12-13 PROBLEM — E66.9 OBESITY (BMI 30-39.9): Status: RESOLVED | Noted: 2017-01-31 | Resolved: 2024-12-13

## 2024-12-13 NOTE — TELEPHONE ENCOUNTER
Please request records from [place] [facility], Dr. Sandra Garcia for this patient with her last Pap smear results

## 2024-12-13 NOTE — PROGRESS NOTES
Annual Physical     Name: Adriana Diamond    : 1965     MRN: [id number]     Chief Complaint  Establish Care (Establish care. Used to see bill. )    Subjective     History of Present Illness:  Adriana Diamond is a 59 y.o. female who presents today for  establish care with new provider and annual physical exam    Care gaps reviewed prior to today's visit.  Shingles vaccine is up-to-date  Hepatitis C screening has been completed  Tetanus shot completed   Mammogram completed 2023  Colonoscopy completed 2023. Colon polyps and diverticulosis.  Recommendations include 5-year repeat which is 2028  DEXA completed approximately     Patient currently follows with GI regarding gastroparesis and elevated liver enzymes.  Patient does have noted history of metabolic associated fatty liver disease.  Previous scans have noted an incidental hepatic cyst.  It is noted that patient does have gastroparesis that is not particularly problematic for her at the time even though she has ongoing therapy with semaglutide that does have the chance of exacerbating gastroparesis.  Additional diagnoses include GERD with Protonix use and chronic idiopathic constipation with Linzess    Patient would like an updated order for mammogram    Patient does see [place] [facility], Sandra Garcia.  She states she is up-to-date with her Pap smear but does need to call to make a follow-up appointment.  She is postmenopausal with no recent cycles    Dental and vision screenings are up-to-date    She does work to follow active and healthy lifestyle    She is a non-smoker.  Occasional alcohol use.  No drug use.    Father does have diagnosis of diabetes    Patient's  previously worked for [employer] and they did have a clinic that she could be seen at a discounted rate.  She was started and maintained on Wegovy.  Current dose is at 2.4 mg once weekly.  She has not had any side effects.  No increased  symptoms of her gastroparesis.  No constipation.  No personal history of pancreatitis.  No family history of thyroid cancer.  Last labs were completed April 2024      The patient is being seen for a health maintenance evaluation.    Past Medical History:   Diagnosis Date    Acute sinusitis     Allergic reaction     Arthritis     Cheilosis     Contact dermatitis     Encounter for preventive health examination     Health care maintenance 06/22/2021    Herpes simplex type 1 infection     Take valtrex as directed    Hyperlipidemia     Obesity (BMI 30-39.9) 01/31/2017    Onychomycosis     Trial of lmisil ointment as perscribed. She will try for 3-4 weeks, if no improvement can try oral meds    Oral thrush     Rash 06/07/2016    Snoring     Stomatitis     Unintended weight gain     check tsh       Past Surgical History:   Procedure Laterality Date    BREAST BIOPSY Left 2008    stereo    COLONOSCOPY  2023    FRACTURE SURGERY  2018    UPPER GASTROINTESTINAL ENDOSCOPY         Social History     Socioeconomic History    Marital status:    Tobacco Use    Smoking status: Never    Smokeless tobacco: Never   Vaping Use    Vaping status: Never Used   Substance and Sexual Activity    Alcohol use: Yes     Comment: social    Drug use: No    Sexual activity: Defer         Current Outpatient Medications:     linaclotide (Linzess) 72 MCG capsule capsule, Take 1 capsule by mouth Daily. 30 minutes prior to a meal, Disp: 90 capsule, Rfl: 3    melatonin 5 MG tablet tablet, Take 1 tablet by mouth., Disp: , Rfl:     Multiple Vitamins-Minerals (MULTI-VITAMIN GUMMIES) chewable tablet, Chew daily., Disp: , Rfl:     pantoprazole (PROTONIX) 40 MG EC tablet, Take 1 tablet by mouth 2 (Two) Times a Day. (Patient not taking: Reported on 12/13/2024), Disp: 180 tablet, Rfl: 3    Semaglutide-Weight Management 2.4 MG/0.75ML solution auto-injector, Inject 2.4 mg under the skin into the appropriate area as directed 1 (One) Time Per Week., Disp: 9 mL,  "Rfl: 3    General History  Adriana  does have regular dental visits.  She does complain of vision problems. Last eye exam was up-to-date.  Immunizations are not up to date. The patient needs the following immunizations: Respectfully declined    Lifestyle  Adriana  consumes in general, a \"healthy\" diet  .  She exercises intermittently.    Reproductive Health  Adriana  is postmenopausal.  She reports periods are rare.  She is sexually active. Her contraceptive plan is no method.    Screening  Last pap was - needing to call and schedule appointment with [facility]  Last Completed Pap Smear       This patient has no relevant Health Maintenance data.        . History of abnormal pap smear or family history of gyn cancer: None stated  Last mammogram was patient would like updated order  Last Completed Mammogram            Ordered - MAMMOGRAM (Every 2 Years) Ordered on 12/13/2024      10/05/2023  Mammo Screening Digital Tomosynthesis Bilateral With CAD    08/12/2022  Mammo Screening Digital Tomosynthesis Bilateral With CAD    08/09/2021  Mammo Screening Digital Tomosynthesis Bilateral With CAD    08/07/2020  Mammo Screening Digital Tomosynthesis Bilateral With CAD    08/02/2019  Mammo Screening Digital Tomosynthesis Bilateral With CAD    Only the first 5 history entries have been loaded, but more history exists.                . Personal or family history of abnormal mammograms or breast cancer: None stated  Last colonoscopy was 2023 with a recommended repeat January 2028  Last Completed Colonoscopy            COLORECTAL CANCER SCREENING (COLONOSCOPY - Every 5 Years) Next due on 1/6/2028 01/06/2023  SCANNED - COLONOSCOPY    01/06/2023  SCANNED - COLONOSCOPY    01/06/2023  SCANNED - COLONOSCOPY    05/05/2017  COLONOSCOPY (Done - Dr Chambers)    05/05/2017  SCANNED - COLONOSCOPY    Only the first 5 history entries have been loaded, but more history exists.                . Family history of colon cancer: " None stated  Last DEXA was approximately 2021.         Health Maintenance Summary            Overdue - PAP SMEAR (Every 3 Years) Overdue since 8/9/2023 08/09/2020  Done              Postponed - INFLUENZA VACCINE (Yearly - July to March) Postponed until 3/31/2025      12/13/2024  Postponed until 3/31/2025 by Marci Macias MA (Patient Refused)    11/02/2019  Imm Admin: Flublock Quad =>18yrs    11/02/2019  Imm Admin: Fluzone  >6mos    10/09/2018  Imm Admin: Flucelvax Quad Vial =>4yrs    10/09/2018  Imm Admin: Influenza, Unspecified    Only the first 5 history entries have been loaded, but more history exists.              Postponed - COVID-19 Vaccine (1 - 2024-25 season) Postponed until 12/1/2025 12/13/2024  Postponed until 12/1/2025 by Baylee Rubi APRN (Patient Refused)    06/22/2021  Postponed until 6/24/2021 by Judith Negron PA (Patient Refused)              Ordered - LIPID PANEL (Yearly) Ordered on 12/13/2024 04/01/2024  Lipid Panel    06/24/2022  Lipid Panel    06/22/2021  Lipid Panel    05/17/2019  Lipid Panel    03/09/2018  Lipid Panel    Only the first 5 history entries have been loaded, but more history exists.              Ordered - MAMMOGRAM (Every 2 Years) Ordered on 12/13/2024      10/05/2023  Mammo Screening Digital Tomosynthesis Bilateral With CAD    08/12/2022  Mammo Screening Digital Tomosynthesis Bilateral With CAD    08/09/2021  Mammo Screening Digital Tomosynthesis Bilateral With CAD    08/07/2020  Mammo Screening Digital Tomosynthesis Bilateral With CAD    08/02/2019  Mammo Screening Digital Tomosynthesis Bilateral With CAD    Only the first 5 history entries have been loaded, but more history exists.              ANNUAL PHYSICAL (Yearly) Next due on 12/13/2025 12/13/2024  Done    06/22/2021  Done              BMI FOLLOWUP (Yearly) Next due on 12/13/2025 12/13/2024  Registry Metric: BMI Follow-up    06/22/2021  SmartData: WORKFLOW - QUALITY MEASUREMENT - BMI  "FOLLOW UP CARE PLAN DOCUMENTED    06/22/2021  SmartData: WORKFLOW - QUALITY MEASUREMENT - DOCUMENTED WEIGHT FOLLOW-UP PLAN    10/09/2018  SmartData: WORKFLOW - QUALITY MEASUREMENT - BMI FOLLOW UP CARE PLAN DOCUMENTED    05/18/2018  SmartData: WORKFLOW - QUALITY MEASUREMENT - BMI FOLLOW UP CARE PLAN DOCUMENTED    Only the first 5 history entries have been loaded, but more history exists.              COLORECTAL CANCER SCREENING (COLONOSCOPY - Every 5 Years) Next due on 1/6/2028 01/30/2023  Follow-up changed to COLONOSCOPY - Every 5 Years by Amelia Sawyer    01/06/2023  SCANNED - COLONOSCOPY    01/06/2023  SCANNED - COLONOSCOPY    01/06/2023  SCANNED - COLONOSCOPY    05/05/2017  COLONOSCOPY (Done - Dr Chambers)    Only the first 5 history entries have been loaded, but more history exists.              TDAP/TD VACCINES (2 - Td or Tdap) Next due on 6/22/2031 06/22/2021  Imm Admin: Tdap              ZOSTER VACCINE (Series Information) Completed      05/17/2019  Postponed until 5/25/2020 by Arcelia Gonzalez MA (Product Unavailable)    09/24/2018  Imm Admin: Shingrix    05/12/2018  Imm Admin: Shingrix              HEPATITIS C SCREENING  Completed      05/17/2019  Hepatitis C Antibody              Pneumococcal Vaccine 0-64 (Series Information) Aged Out      No completion, postpone, or frequency change history exists for this topic.                  Immunization History   Administered Date(s) Administered    Flublock Quad =>18yrs 11/02/2019    Flucelvax Quad Vial =>4yrs 10/09/2018    Fluzone  >6mos 09/27/2017, 11/02/2019    Fluzone Quad >6mos (Multi-dose) 09/27/2017    Hepatitis A 05/12/2018, 11/23/2018    Influenza, Unspecified 10/09/2018    Shingrix 05/12/2018, 09/24/2018    Tdap 06/22/2021    influenza Split 09/30/2016           Objective     Vital Signs  /68 (BP Location: Left arm, Patient Position: Sitting)   Pulse 73   Ht 152.4 cm (60\")   Wt 59.9 kg (132 lb)   SpO2 97%   BMI 25.78 kg/m² " "  Estimated body mass index is 25.78 kg/m² as calculated from the following:    Height as of this encounter: 152.4 cm (60\").    Weight as of this encounter: 59.9 kg (132 lb).    BMI is >= 25 and <30. (Overweight) The following options were offered after discussion;: exercise counseling/recommendations and nutrition counseling/recommendations         Physical Exam  Vitals and nursing note reviewed.   Constitutional:       General: She is awake.      Appearance: Normal appearance. She is well-groomed and overweight.   HENT:      Head: Normocephalic and atraumatic.   Eyes:      Extraocular Movements: Extraocular movements intact.      Pupils: Pupils are equal, round, and reactive to light.   Cardiovascular:      Rate and Rhythm: Normal rate and regular rhythm.      Pulses: Normal pulses.           Radial pulses are 2+ on the right side and 2+ on the left side.        Posterior tibial pulses are 2+ on the right side and 2+ on the left side.      Heart sounds: Normal heart sounds, S1 normal and S2 normal.   Pulmonary:      Effort: Pulmonary effort is normal.      Breath sounds: Normal breath sounds.   Abdominal:      General: Bowel sounds are normal.      Palpations: Abdomen is soft.   Musculoskeletal:         General: Normal range of motion.   Skin:     General: Skin is warm and dry.   Neurological:      Mental Status: She is alert and oriented to person, place, and time.      Comments: Mental status fully intact as patient was able to provide a detailed description of the events   Psychiatric:         Mood and Affect: Mood normal.         Behavior: Behavior normal. Behavior is cooperative.         Thought Content: Thought content normal.         Judgment: Judgment normal.                   Assessment and Plan     Diagnoses and all orders for this visit:    1. Annual physical exam (Primary)  -     CBC (No Diff); Future  -     Comprehensive Metabolic Panel; Future  -     Lipid Panel; Future  -     TSH Rfx On Abnormal To " Free T4; Future  -     Hemoglobin A1c; Future    2. Establishing care with new doctor, encounter for    3. Myofascial pain    4. Encounter for well adult exam without abnormal findings    5. Elevated ALT measurement    6. History of colonic polyps    7. Diverticulosis    8. Gastroparesis    9. Screening for lipid disorders  -     Lipid Panel; Future    10. Screening for thyroid disorder  -     TSH Rfx On Abnormal To Free T4; Future    11. Screening for diabetes mellitus  -     Hemoglobin A1c; Future    12. Weight gain  -     CBC (No Diff); Future  -     Comprehensive Metabolic Panel; Future  -     Semaglutide-Weight Management 2.4 MG/0.75ML solution auto-injector; Inject 2.4 mg under the skin into the appropriate area as directed 1 (One) Time Per Week.  Dispense: 9 mL; Refill: 3    13. BMI 25.0-25.9,adult  -     Semaglutide-Weight Management 2.4 MG/0.75ML solution auto-injector; Inject 2.4 mg under the skin into the appropriate area as directed 1 (One) Time Per Week.  Dispense: 9 mL; Refill: 3    14. Overweight (BMI 25.0-29.9)  -     Semaglutide-Weight Management 2.4 MG/0.75ML solution auto-injector; Inject 2.4 mg under the skin into the appropriate area as directed 1 (One) Time Per Week.  Dispense: 9 mL; Refill: 3    15. Encounter for screening mammogram for breast cancer  -     Mammo Screening Digital Tomosynthesis Bilateral With CAD; Future    16. Postmenopausal    17. Non-smoker    18. Immunization declined    19. Family history of diabetes mellitus    Plan  Annual physical exam and establish care with new provider completed with patient today    Patient will continue to regularly follow with GI.    Updated order for mammogram placed in chart today    Will request records from [facility] with patient's last updated Pap smear.  Reminded patient to make sure that she calls this office to remain up-to-date with those providers    Patient respectfully declined immunizations    Continue with non-smoking  status and healthy lifestyle    Regarding her Wegovy, patient is currently on the 2.4 mg from an outside location.  She is tolerating this medication well without any significant side effects.  No personal history of pancreatitis or family history of thyroid cancer.  She does prefer [facility] is the pharmacy.  She does prefer a 3-month supply.  Patient is aware that her last labs were obtained in April.  We will need updated lab work including an A1c and a thyroid level.  Patient did voiced understanding and will return to the office at her leisure to have this completed.    Go to ER if any condition worsens or severe    Will plan to follow-up in 6 months for chronic care and weight check.  We may consider updated labs at that time    Plan to follow-up 1 year for annual physical exam    Follow Up  Return for 6m chronic care and 1 year for annual .    MALAIKA Salamanca    Part of this note may be an electronic transcription/translation of spoken language to printed text using the Dragon Dictation System.

## 2024-12-13 NOTE — PROGRESS NOTES
Office Note     Name: Adriana Diamond    : 1965     MRN: [id number]     Chief Complaint  No chief complaint on file.    Subjective     History of Present Illness:  Adriana Diamond is a 59 y.o. female who presents today for establish care with new provider    Care gaps reviewed prior to today's visit.  Shingles vaccine is up-to-date  Hepatitis C screening has been completed  Tetanus shot completed   Mammogram completed 2023  Colonoscopy completed 2023. Colon polyps and diverticulosis.  Recommendations include 5-year repeat which is 2028    Patient currently follows with GI regarding gastroparesis and elevated liver enzymes.  Patient does have noted history of metabolic associated fatty liver disease.  Previous scans have noted an incidental hepatic cyst.  It is noted that patient does have gastroparesis that is not particularly problematic for her at the time even though she has ongoing therapy with semaglutide that does have the chance of exacerbating gastroparesis.  Additional diagnoses include GERD with Protonix use and chronic idiopathic constipation with Linzess            Past Medical History:   Diagnosis Date    Acute sinusitis     Allergic reaction     Arthritis     Cheilosis     Contact dermatitis     Encounter for preventive health examination     Health care maintenance 2021    Herpes simplex type 1 infection     Take valtrex as directed    Hyperlipidemia     Onychomycosis     Trial of lmisil ointment as perscribed. She will try for 3-4 weeks, if no improvement can try oral meds    Oral thrush     Stomatitis     Unintended weight gain     check tsh       Past Surgical History:   Procedure Laterality Date    BREAST BIOPSY Left     stereo    COLONOSCOPY      FRACTURE SURGERY  2018    UPPER GASTROINTESTINAL ENDOSCOPY         Social History     Socioeconomic History    Marital status:    Tobacco Use    Smoking status: Never    Smokeless  "tobacco: Never   Vaping Use    Vaping status: Never Used   Substance and Sexual Activity    Alcohol use: Yes     Comment: social    Drug use: No    Sexual activity: Defer         Current Outpatient Medications:     linaclotide (Linzess) 72 MCG capsule capsule, Take 1 capsule by mouth Daily. 30 minutes prior to a meal, Disp: 90 capsule, Rfl: 3    melatonin 5 MG tablet tablet, Take 1 tablet by mouth., Disp: , Rfl:     Multiple Vitamins-Minerals (MULTI-VITAMIN GUMMIES) chewable tablet, Chew daily., Disp: , Rfl:     pantoprazole (PROTONIX) 40 MG EC tablet, Take 1 tablet by mouth 2 (Two) Times a Day., Disp: 180 tablet, Rfl: 3    Wegovy 1.7 MG/0.75ML solution auto-injector, INJECT 1.7mg (1pen) SUBCUTANEOUSLY ONCE WEEKLY FOR 4 WEEKS +++[facility]+++, Disp: , Rfl:     Objective     Vital Signs  There were no vitals taken for this visit.  Estimated body mass index is 25.51 kg/m² as calculated from the following:    Height as of 12/4/24: 152.4 cm (60\").    Weight as of 12/4/24: 59.2 kg (130 lb 9.6 oz).    {BMI is >= 25 and <30. (Overweight) The following options were offered after discussion; (Optional):08901}      PHQ-9 Depression Screening  Little interest or pleasure in doing things?     Feeling down, depressed, or hopeless?     PHQ-2 Total Score     Trouble falling or staying asleep, or sleeping too much?     Feeling tired or having little energy?     Poor appetite or overeating?     Feeling bad about yourself - or that you are a failure or have let yourself or your family down?     Trouble concentrating on things, such as reading the newspaper or watching television?     Moving or speaking so slowly that other people could have noticed? Or the opposite - being so fidgety or restless that you have been moving around a lot more than usual?     Thoughts that you would be better off dead, or of hurting yourself in some way?     PHQ-9 Total Score     If you checked off any problems, how difficult have " these problems made it for you to do your work, take care of things at home, or get along with other people?       PHQ-9 Total Score:      SHERICE-7       Physical Exam     Lab Review:  {Ambulatory Labs (Optional):64925}         Assessment and Plan     There are no diagnoses linked to this encounter.    Follow Up  No follow-ups on file.    MALAIKA Salamanca    Part of this note may be an electronic transcription/translation of spoken language to printed text using the Dragon Dictation System.

## 2025-01-02 ENCOUNTER — LAB (OUTPATIENT)
Dept: LAB | Facility: HOSPITAL | Age: 60
End: 2025-01-02
Payer: COMMERCIAL

## 2025-01-02 DIAGNOSIS — Z13.1 SCREENING FOR DIABETES MELLITUS: ICD-10-CM

## 2025-01-02 DIAGNOSIS — Z13.29 SCREENING FOR THYROID DISORDER: ICD-10-CM

## 2025-01-02 DIAGNOSIS — R63.5 WEIGHT GAIN: ICD-10-CM

## 2025-01-02 DIAGNOSIS — Z00.00 ANNUAL PHYSICAL EXAM: ICD-10-CM

## 2025-01-02 DIAGNOSIS — Z13.220 SCREENING FOR LIPID DISORDERS: ICD-10-CM

## 2025-01-02 LAB
ALBUMIN SERPL-MCNC: 4.5 G/DL (ref 3.5–5.2)
ALBUMIN/GLOB SERPL: 2 G/DL
ALP SERPL-CCNC: 101 U/L (ref 39–117)
ALT SERPL W P-5'-P-CCNC: 20 U/L (ref 1–33)
ANION GAP SERPL CALCULATED.3IONS-SCNC: 11 MMOL/L (ref 5–15)
AST SERPL-CCNC: 19 U/L (ref 1–32)
BILIRUB SERPL-MCNC: 0.5 MG/DL (ref 0–1.2)
BUN SERPL-MCNC: 17 MG/DL (ref 8–23)
BUN/CREAT SERPL: 25.4 (ref 7–25)
CALCIUM SPEC-SCNC: 9.7 MG/DL (ref 8.6–10.5)
CHLORIDE SERPL-SCNC: 103 MMOL/L (ref 98–107)
CHOLEST SERPL-MCNC: 249 MG/DL (ref 0–200)
CO2 SERPL-SCNC: 25 MMOL/L (ref 22–29)
CREAT SERPL-MCNC: 0.67 MG/DL (ref 0.57–1)
DEPRECATED RDW RBC AUTO: 42.3 FL (ref 37–54)
EGFRCR SERPLBLD CKD-EPI 2021: 100.2 ML/MIN/1.73
ERYTHROCYTE [DISTWIDTH] IN BLOOD BY AUTOMATED COUNT: 12.5 % (ref 12.3–15.4)
GLOBULIN UR ELPH-MCNC: 2.3 GM/DL
GLUCOSE SERPL-MCNC: 84 MG/DL (ref 65–99)
HBA1C MFR BLD: 5 % (ref 4.8–5.6)
HCT VFR BLD AUTO: 44.7 % (ref 34–46.6)
HDLC SERPL-MCNC: 71 MG/DL (ref 40–60)
HGB BLD-MCNC: 14.8 G/DL (ref 12–15.9)
LDLC SERPL CALC-MCNC: 160 MG/DL (ref 0–100)
LDLC/HDLC SERPL: 2.21 {RATIO}
MCH RBC QN AUTO: 30.7 PG (ref 26.6–33)
MCHC RBC AUTO-ENTMCNC: 33.1 G/DL (ref 31.5–35.7)
MCV RBC AUTO: 92.7 FL (ref 79–97)
PLATELET # BLD AUTO: 199 10*3/MM3 (ref 140–450)
PMV BLD AUTO: 11.1 FL (ref 6–12)
POTASSIUM SERPL-SCNC: 5 MMOL/L (ref 3.5–5.2)
PROT SERPL-MCNC: 6.8 G/DL (ref 6–8.5)
RBC # BLD AUTO: 4.82 10*6/MM3 (ref 3.77–5.28)
SODIUM SERPL-SCNC: 139 MMOL/L (ref 136–145)
TRIGL SERPL-MCNC: 105 MG/DL (ref 0–150)
TSH SERPL DL<=0.05 MIU/L-ACNC: 1.27 UIU/ML (ref 0.27–4.2)
VLDLC SERPL-MCNC: 18 MG/DL (ref 5–40)
WBC NRBC COR # BLD AUTO: 6.62 10*3/MM3 (ref 3.4–10.8)

## 2025-01-02 PROCEDURE — 83036 HEMOGLOBIN GLYCOSYLATED A1C: CPT

## 2025-01-02 PROCEDURE — 80061 LIPID PANEL: CPT

## 2025-01-02 PROCEDURE — 80050 GENERAL HEALTH PANEL: CPT

## 2025-01-29 ENCOUNTER — OFFICE VISIT (OUTPATIENT)
Dept: INTERNAL MEDICINE | Facility: CLINIC | Age: 60
End: 2025-01-29
Payer: COMMERCIAL

## 2025-01-29 VITALS
WEIGHT: 136.2 LBS | TEMPERATURE: 97.3 F | HEART RATE: 65 BPM | SYSTOLIC BLOOD PRESSURE: 112 MMHG | HEIGHT: 60 IN | DIASTOLIC BLOOD PRESSURE: 76 MMHG | BODY MASS INDEX: 26.74 KG/M2 | OXYGEN SATURATION: 98 %

## 2025-01-29 DIAGNOSIS — R05.1 ACUTE COUGH: Primary | ICD-10-CM

## 2025-01-29 DIAGNOSIS — J10.1 INFLUENZA B: ICD-10-CM

## 2025-01-29 DIAGNOSIS — Z13.31 DEPRESSION SCREEN: ICD-10-CM

## 2025-01-29 LAB
EXPIRATION DATE: ABNORMAL
FLUAV AG UPPER RESP QL IA.RAPID: NOT DETECTED
FLUBV AG UPPER RESP QL IA.RAPID: DETECTED
INTERNAL CONTROL: ABNORMAL
Lab: ABNORMAL
SARS-COV-2 AG UPPER RESP QL IA.RAPID: NOT DETECTED

## 2025-01-29 PROCEDURE — 96127 BRIEF EMOTIONAL/BEHAV ASSMT: CPT | Performed by: NURSE PRACTITIONER

## 2025-01-29 PROCEDURE — 87428 SARSCOV & INF VIR A&B AG IA: CPT | Performed by: NURSE PRACTITIONER

## 2025-01-29 PROCEDURE — 99213 OFFICE O/P EST LOW 20 MIN: CPT | Performed by: NURSE PRACTITIONER

## 2025-01-29 NOTE — PROGRESS NOTES
Office Note     Name: Adriana Diamond    : 1965     MRN: [id number]     Chief Complaint  Nasal Congestion (Patient stated she has nasal congestion where one side of her nose it stopped up and the other is running. She did have a sore throat but she said that's gone away. No fevers. )    Subjective     History of Present Illness:  Adriana Diamond is a 59 y.o. female who presents today for respiratory symptoms.  Patient states that her symptoms began Friday evening with a scratchy throat and worsened in the .  The scratchy throat is now gone.  The cough is intermittent.  The nasal congestion and runny nose are her worst symptoms.  No fevers.  No sick contacts.        Past Medical History:   Diagnosis Date    Acute sinusitis     Allergic reaction     Arthritis     Cheilosis     Contact dermatitis     Encounter for preventive health examination     Health care maintenance 2021    Herpes simplex type 1 infection     Take valtrex as directed    Hyperlipidemia     Obesity (BMI 30-39.9) 2017    Onychomycosis     Trial of lmisil ointment as perscribed. She will try for 3-4 weeks, if no improvement can try oral meds    Oral thrush     Rash 2016    Snoring     Stomatitis     Unintended weight gain     check tsh       Past Surgical History:   Procedure Laterality Date    BREAST BIOPSY Left     stereo    COLONOSCOPY      FRACTURE SURGERY  2018    UPPER GASTROINTESTINAL ENDOSCOPY         Social History     Socioeconomic History    Marital status:    Tobacco Use    Smoking status: Never    Smokeless tobacco: Never   Vaping Use    Vaping status: Never Used   Substance and Sexual Activity    Alcohol use: Yes     Comment: social    Drug use: No    Sexual activity: Defer         Current Outpatient Medications:     linaclotide (Linzess) 72 MCG capsule capsule, Take 1 capsule by mouth Daily. 30 minutes prior to a meal, Disp: 90 capsule, Rfl: 3    melatonin 5 MG tablet  "tablet, Take 1 tablet by mouth., Disp: , Rfl:     Multiple Vitamins-Minerals (MULTI-VITAMIN GUMMIES) chewable tablet, Chew daily., Disp: , Rfl:     Semaglutide-Weight Management 2.4 MG/0.75ML solution auto-injector, Inject 2.4 mg under the skin into the appropriate area as directed 1 (One) Time Per Week., Disp: 9 mL, Rfl: 3    Objective     Vital Signs  /76   Pulse 65   Temp 97.3 °F (36.3 °C)   Ht 152.4 cm (60\")   Wt 61.8 kg (136 lb 3.2 oz)   SpO2 98%   BMI 26.60 kg/m²   Estimated body mass index is 26.6 kg/m² as calculated from the following:    Height as of this encounter: 152.4 cm (60\").    Weight as of this encounter: 61.8 kg (136 lb 3.2 oz).            PHQ-9 Depression Screening  Little interest or pleasure in doing things? Not at all   Feeling down, depressed, or hopeless? Not at all   PHQ-2 Total Score 0   Trouble falling or staying asleep, or sleeping too much?     Feeling tired or having little energy?     Poor appetite or overeating?     Feeling bad about yourself - or that you are a failure or have let yourself or your family down?     Trouble concentrating on things, such as reading the newspaper or watching television?     Moving or speaking so slowly that other people could have noticed? Or the opposite - being so fidgety or restless that you have been moving around a lot more than usual?     Thoughts that you would be better off dead, or of hurting yourself in some way?     PHQ-9 Total Score     If you checked off any problems, how difficult have these problems made it for you to do your work, take care of things at home, or get along with other people? Not difficult at all     PHQ-9 Total Score:           Physical Exam  Vitals and nursing note reviewed.   Constitutional:       General: She is awake.      Appearance: Normal appearance. She is well-groomed.   HENT:      Head: Normocephalic and atraumatic.      Right Ear: Hearing, tympanic membrane, ear canal and external ear normal.      " Left Ear: Hearing, tympanic membrane, ear canal and external ear normal.      Nose: Congestion present.      Right Sinus: No maxillary sinus tenderness or frontal sinus tenderness.      Left Sinus: No maxillary sinus tenderness or frontal sinus tenderness.      Mouth/Throat:      Lips: Pink.      Mouth: Mucous membranes are moist.   Eyes:      Extraocular Movements: Extraocular movements intact.      Pupils: Pupils are equal, round, and reactive to light.   Cardiovascular:      Rate and Rhythm: Normal rate and regular rhythm.      Heart sounds: Normal heart sounds.   Pulmonary:      Effort: Pulmonary effort is normal.      Breath sounds: Normal breath sounds.   Musculoskeletal:         General: Normal range of motion.   Skin:     General: Skin is warm and dry.   Neurological:      Mental Status: She is alert and oriented to person, place, and time.   Psychiatric:         Mood and Affect: Mood normal.         Behavior: Behavior normal. Behavior is cooperative.          Lab Review:   Latest Reference Range & Units 01/29/25 09:22   SARS Antigen Not Detected, Presumptive Negative  Not Detected   Expiration Date  11/14/25   Lot Number  [id number]   Influenza A Antigen AKOSUA Not Detected  Not Detected   Influenza B Antigen AKOSUA Not Detected  Detected !   !: Data is abnormal         Assessment and Plan     Diagnoses and all orders for this visit:    1. Acute cough (Primary)  -     POCT SARS-CoV-2 Antigen AKOSUA + Flu    2. Influenza B  -     POCT SARS-CoV-2 Antigen AKOSUA + Flu    3. Depression screen    Plan  Discussed results of in office testing with patient today  Patient respectfully declined Tamiflu treatment  She will continue with at home over-the-counter interventions  She declined needing any particular medications to the pharmacy  Continue to stay well-hydrated and perform coughing and breathing exercises.  Go to ER if any condition worsens or severe  Plan to follow-up as scheduled    Depression screen completed with  negative results    Follow Up  Return for Next scheduled follow up.    MALAIKA Salamanca    Part of this note may be an electronic transcription/translation of spoken language to printed text using the Dragon Dictation System.

## 2025-02-03 ENCOUNTER — OFFICE VISIT (OUTPATIENT)
Dept: INTERNAL MEDICINE | Facility: CLINIC | Age: 60
End: 2025-02-03
Payer: COMMERCIAL

## 2025-02-03 VITALS
SYSTOLIC BLOOD PRESSURE: 122 MMHG | DIASTOLIC BLOOD PRESSURE: 82 MMHG | TEMPERATURE: 98 F | OXYGEN SATURATION: 97 % | BODY MASS INDEX: 26.93 KG/M2 | HEIGHT: 60 IN | WEIGHT: 137.2 LBS | HEART RATE: 65 BPM

## 2025-02-03 DIAGNOSIS — M79.18 MYOFASCIAL PAIN: ICD-10-CM

## 2025-02-03 DIAGNOSIS — R10.13 EPIGASTRIC PAIN: ICD-10-CM

## 2025-02-03 DIAGNOSIS — R74.01 ELEVATED ALT MEASUREMENT: Primary | ICD-10-CM

## 2025-02-03 DIAGNOSIS — K31.84 GASTROPARESIS: ICD-10-CM

## 2025-02-03 DIAGNOSIS — K59.04 CHRONIC IDIOPATHIC CONSTIPATION: ICD-10-CM

## 2025-02-03 DIAGNOSIS — R19.8 GI SYMPTOM: ICD-10-CM

## 2025-02-03 DIAGNOSIS — Z86.0100 HISTORY OF COLONIC POLYPS: ICD-10-CM

## 2025-02-03 PROCEDURE — 99213 OFFICE O/P EST LOW 20 MIN: CPT | Performed by: NURSE PRACTITIONER

## 2025-02-03 NOTE — PROGRESS NOTES
Office Note     Name: Adriana Diamond    : 1965     MRN: [id number]     Chief Complaint  GI Problem (Patient stated she wants a second opinion. She has a pain in the top middle stomach and it is in her back. She had an endoscopy done and they didn't find anything done. No CT'S or MRI'S. )    Subjective     History of Present Illness:  Adriana Diamond is a 59 y.o. female who presents today for GI concerns    Patient currently follows with Dr. Shaikh's through [facility].  She was last seen in December.  She is status post EGD 2023 and colonoscopy 2023.  Colonoscopy with adenomatous colonic polyps with a recommended 5-year repeat.  She has also had a noted incidental hepatic cyst on the left hepatic lobe.  She does have a history of elevated liver enzymes but recent were within normal.  She does have a history of myofascial pain that was reproducible on exam.  She was prescribed Elavil but only took 1 dose and did not feel it was effective.  She also has noted history of gastroparesis.  She is aware that her injectable weight loss medication can exacerbate the gastroparesis.  She does have noted chronic constipation with use of Linzess as well as GERD with use of Protonix.    Patient states that the symptoms have been present prior to starting on her Wegovy.  She is no longer on the Protonix.  She ideally does not want to take medication to cause more side effects.  She would appreciate additional imaging and being sent for a second opinion.  Patient states that her pain is in the epigastric area and will radiate around to her back.  Pain is intermittent.  Denies any specific triggers.  She reports relief of symptoms with mild pressure to the epigastric area or laying flat.          Past Medical History:   Diagnosis Date    Acute sinusitis     Allergic reaction     Arthritis     Cheilosis     Contact dermatitis     Encounter for preventive health examination     Health care  "maintenance 06/22/2021    Herpes simplex type 1 infection     Take valtrex as directed    Hyperlipidemia     Obesity (BMI 30-39.9) 01/31/2017    Onychomycosis     Trial of lmisil ointment as perscribed. She will try for 3-4 weeks, if no improvement can try oral meds    Oral thrush     Rash 06/07/2016    Snoring     Stomatitis     Unintended weight gain     check tsh       Past Surgical History:   Procedure Laterality Date    BREAST BIOPSY Left 2008    stereo    COLONOSCOPY  2023    FRACTURE SURGERY  2018    UPPER GASTROINTESTINAL ENDOSCOPY         Social History     Socioeconomic History    Marital status:    Tobacco Use    Smoking status: Never    Smokeless tobacco: Never   Vaping Use    Vaping status: Never Used   Substance and Sexual Activity    Alcohol use: Yes     Comment: social    Drug use: No    Sexual activity: Defer         Current Outpatient Medications:     linaclotide (Linzess) 72 MCG capsule capsule, Take 1 capsule by mouth Daily. 30 minutes prior to a meal, Disp: 90 capsule, Rfl: 3    melatonin 5 MG tablet tablet, Take 1 tablet by mouth., Disp: , Rfl:     Multiple Vitamins-Minerals (MULTI-VITAMIN GUMMIES) chewable tablet, Chew daily., Disp: , Rfl:     Semaglutide-Weight Management 2.4 MG/0.75ML solution auto-injector, Inject 2.4 mg under the skin into the appropriate area as directed 1 (One) Time Per Week., Disp: 9 mL, Rfl: 3    Zinc Sulfate (ZINC 15 PO), Take  by mouth., Disp: , Rfl:     Objective     Vital Signs  /82 (BP Location: Left arm, Patient Position: Sitting, Cuff Size: Adult)   Pulse 65   Temp 98 °F (36.7 °C)   Ht 152.4 cm (60\")   Wt 62.2 kg (137 lb 3.2 oz)   SpO2 97%   BMI 26.80 kg/m²   Estimated body mass index is 26.8 kg/m² as calculated from the following:    Height as of this encounter: 152.4 cm (60\").    Weight as of this encounter: 62.2 kg (137 lb 3.2 oz).             Physical Exam  Vitals and nursing note reviewed.   Constitutional:       Appearance: Normal " appearance.   HENT:      Head: Normocephalic and atraumatic.   Eyes:      Extraocular Movements: Extraocular movements intact.      Pupils: Pupils are equal, round, and reactive to light.   Cardiovascular:      Rate and Rhythm: Normal rate and regular rhythm.   Pulmonary:      Effort: Pulmonary effort is normal.   Musculoskeletal:         General: Normal range of motion.   Skin:     General: Skin is warm and dry.   Neurological:      Mental Status: She is alert and oriented to person, place, and time.   Psychiatric:         Mood and Affect: Mood normal.         Behavior: Behavior normal.                   Assessment and Plan     Diagnoses and all orders for this visit:    1. Elevated ALT measurement (Primary)  -     CT Abdomen Pelvis With & Without Contrast; Future  -     Ambulatory Referral to Gastroenterology    2. Gastroparesis  -     CT Abdomen Pelvis With & Without Contrast; Future  -     Ambulatory Referral to Gastroenterology    3. History of colonic polyps  -     Ambulatory Referral to Gastroenterology    4. GI symptom  -     CT Abdomen Pelvis With & Without Contrast; Future  -     Ambulatory Referral to Gastroenterology    5. Chronic idiopathic constipation  -     CT Abdomen Pelvis With & Without Contrast; Future  -     Ambulatory Referral to Gastroenterology    6. Myofascial pain  -     Ambulatory Referral to Gastroenterology    7. Epigastric pain  -     CT Abdomen Pelvis With & Without Contrast; Future  -     Ambulatory Referral to Gastroenterology    Plan  Discussed with patient that we will move forward with referral for second opinion.  Referral was placed to [facility] here in [place].  Patient will be called and notified about appointment information  Updated order for CT abdomen pelvis with and without contrast for further evaluation.  Go to ER if any condition worsens or severe  Plan to follow-up as scheduled    Follow Up  Return for Next scheduled follow up.    MALAIKA Salamanca    Part of  this note may be an electronic transcription/translation of spoken language to printed text using the Dragon Dictation System.

## 2025-02-10 ENCOUNTER — TELEPHONE (OUTPATIENT)
Dept: INTERNAL MEDICINE | Facility: CLINIC | Age: 60
End: 2025-02-10
Payer: COMMERCIAL

## 2025-02-10 NOTE — TELEPHONE ENCOUNTER
Notified patient that we needed to reschedule appt from 6/13. Patient verbalized understanding and rescheduled.

## 2025-02-20 ENCOUNTER — HOSPITAL ENCOUNTER (OUTPATIENT)
Dept: CT IMAGING | Facility: HOSPITAL | Age: 60
Discharge: HOME | End: 2025-02-20
Admitting: NURSE PRACTITIONER
Payer: COMMERCIAL

## 2025-02-20 DIAGNOSIS — K59.04 CHRONIC IDIOPATHIC CONSTIPATION: ICD-10-CM

## 2025-02-20 DIAGNOSIS — R10.13 EPIGASTRIC PAIN: ICD-10-CM

## 2025-02-20 DIAGNOSIS — R74.01 ELEVATED ALT MEASUREMENT: ICD-10-CM

## 2025-02-20 DIAGNOSIS — R19.8 GI SYMPTOM: ICD-10-CM

## 2025-02-20 DIAGNOSIS — K31.84 GASTROPARESIS: ICD-10-CM

## 2025-02-20 PROCEDURE — 25510000001 IOPAMIDOL 61 % SOLUTION: Performed by: NURSE PRACTITIONER

## 2025-02-20 PROCEDURE — 74178 CT ABD&PLV WO CNTR FLWD CNTR: CPT

## 2025-02-20 RX ORDER — IOPAMIDOL 612 MG/ML
85 INJECTION, SOLUTION INTRAVASCULAR
Status: COMPLETED | OUTPATIENT
Start: 2025-02-20 | End: 2025-02-20

## 2025-02-20 RX ADMIN — IOPAMIDOL 85 ML: 612 INJECTION, SOLUTION INTRAVENOUS at 13:24

## 2025-02-21 DIAGNOSIS — K59.04 CHRONIC IDIOPATHIC CONSTIPATION: ICD-10-CM

## 2025-02-21 RX ORDER — LINACLOTIDE 72 UG/1
CAPSULE, GELATIN COATED ORAL
Qty: 90 CAPSULE | Refills: 3 | Status: SHIPPED | OUTPATIENT
Start: 2025-02-21

## 2025-02-24 ENCOUNTER — TELEPHONE (OUTPATIENT)
Dept: INTERNAL MEDICINE | Facility: CLINIC | Age: 60
End: 2025-02-24
Payer: COMMERCIAL

## 2025-02-24 NOTE — TELEPHONE ENCOUNTER
Called and spoke to pt, gave message from provider. Pt voiced understanding and appreciation.   Per pt: She has not heard from [facility], [place] gave number to [facility].     Pt stated that she had a cyst on center of wrist, looking for referral to orthopedics to have it looked at to have it removed. Does pt need an appt for referral or can PCP make a referral?          ----- Message from Baylee Rubi sent at 2/24/2025 11:48 AM EST -----  Please let patient know scan of her abdomen resulted.  No significant or acute abnormalities noted of the abdomen or pelvis.  It did show mild diverticulosis but no diverticulitis.  It did notice some arthritis like changes in the lumbar spine as well.  I know at previous visit I also placed a referral to [facility] for a second opinion.  Have you been notified about this upcoming appointment?

## 2025-02-24 NOTE — TELEPHONE ENCOUNTER
Called and left VM for pt to return call to office.   OK for HUB to relay message below from provider and schedule apt with provider if pt would like to do so.

## 2025-02-25 NOTE — TELEPHONE ENCOUNTER
Notified patient of provider message. Patient states she will check with [facility] to see if they will take her without a referral if they do not she will call our office back to get an appt with ad to get that referral placed. No further questions or concerns at this time.

## 2025-03-07 ENCOUNTER — OFFICE VISIT (OUTPATIENT)
Dept: INTERNAL MEDICINE | Facility: CLINIC | Age: 60
End: 2025-03-07
Payer: COMMERCIAL

## 2025-03-07 VITALS
DIASTOLIC BLOOD PRESSURE: 78 MMHG | SYSTOLIC BLOOD PRESSURE: 112 MMHG | BODY MASS INDEX: 27.13 KG/M2 | HEART RATE: 75 BPM | HEIGHT: 60 IN | TEMPERATURE: 98 F | WEIGHT: 138.2 LBS | OXYGEN SATURATION: 97 %

## 2025-03-07 DIAGNOSIS — Z09 ENCOUNTER FOR FOLLOW-UP: Primary | ICD-10-CM

## 2025-03-07 DIAGNOSIS — M79.18 MYOFASCIAL PAIN: ICD-10-CM

## 2025-03-07 DIAGNOSIS — K31.84 GASTROPARESIS: ICD-10-CM

## 2025-03-07 DIAGNOSIS — R10.13 EPIGASTRIC PAIN: ICD-10-CM

## 2025-03-07 DIAGNOSIS — Z86.0100 HISTORY OF COLONIC POLYPS: ICD-10-CM

## 2025-03-07 DIAGNOSIS — M71.331 SYNOVIAL CYST OF WRIST, RIGHT: ICD-10-CM

## 2025-03-07 DIAGNOSIS — R74.01 ELEVATED ALT MEASUREMENT: ICD-10-CM

## 2025-03-07 DIAGNOSIS — R23.8 CHANGE OF SKIN COLOR: ICD-10-CM

## 2025-03-07 DIAGNOSIS — K59.04 CHRONIC IDIOPATHIC CONSTIPATION: ICD-10-CM

## 2025-03-07 DIAGNOSIS — M51.369 DEGENERATION OF INTERVERTEBRAL DISC OF LUMBAR REGION, UNSPECIFIED WHETHER PAIN PRESENT: ICD-10-CM

## 2025-03-07 DIAGNOSIS — K57.90 DIVERTICULOSIS: ICD-10-CM

## 2025-03-07 RX ORDER — OMEPRAZOLE/SODIUM BICARBONATE 40; 1680 MG/1; MG/1
POWDER, FOR SUSPENSION ORAL
COMMUNITY

## 2025-03-07 NOTE — PROGRESS NOTES
Office Note     Name: Adriana Diamond    : 1965     MRN: [id number]     Chief Complaint  Med Refill (Patient needs a refill for wegovy. And referrals she is wanting to check on. )    Subjective     History of Present Illness:  Adriana Diamond is a 59 y.o. female who presents today for follow-up    Patient was last seen 2025.  At that time patient requested referral for second opinion regarding her GI symptoms.  She was describing epigastric pain that would radiate to the back.  Described as intermittent.  No specific triggers.  She does have noted history of gastroparesis.  She also has noted myofascial pain with prescription of Elavil but patient did not feel was effective.  She is no longer taking Protonix.  She also has noted constipation with use of Linzess.  EGD completed 2023.  Colonoscopy completed 2023 with adenomatous colon polyps with a recommended 5-year repeat.  At that visit referral for second opinion was placed.  CT abdomen pelvis was also ordered.The CT did not show any acute findings.  It did note diverticulosis but no diverticulitis.  Also showed arthritis like changes in the lumbar spine.  When she received a phone call about this she was also concerned about a cyst on her wrist and want a referral to orthopedics.  She is also interested in restarting on the Wegovy for assistance with her weight  -patient states she has not heard about the referral from [facility]    Patient had questions about her Wegovy prescription.  Reviewed with patient today as this prescription was sent for 1 full year in 2024.  It does note receipt from pharmacy which is [facility]    Patient has a cyst along the inside aspect of her right wrist.  It has been there for some time.  She does note some soreness and some radiation of pain down the arm.  She thinks it has been present for maybe a year or so.  Size has not gotten smaller but also has not significantly  increased in size    Patient states in the past she was screened for lupus and Raynaud's as other family members have been positive for Raynaud's.  She notes that the index finger to her left hand will intermittently get cold and white.  She does have some numbness.  This lasted for a few minutes and then will dissipate.  She denies that it is her whole hand or her feet.  It usually happens about every day.      Past Medical History:   Diagnosis Date    Acute sinusitis     Allergic reaction     Arthritis     Cheilosis     Contact dermatitis     Encounter for preventive health examination     Health care maintenance 06/22/2021    Herpes simplex type 1 infection     Take valtrex as directed    Hyperlipidemia     Obesity (BMI 30-39.9) 01/31/2017    Onychomycosis     Trial of lmisil ointment as perscribed. She will try for 3-4 weeks, if no improvement can try oral meds    Oral thrush     Rash 06/07/2016    Snoring     Stomatitis     Unintended weight gain     check tsh       Past Surgical History:   Procedure Laterality Date    BREAST BIOPSY Left 2008    stereo    COLONOSCOPY  2023    FRACTURE SURGERY  2018    UPPER GASTROINTESTINAL ENDOSCOPY         Social History     Socioeconomic History    Marital status:    Tobacco Use    Smoking status: Never    Smokeless tobacco: Never   Vaping Use    Vaping status: Never Used   Substance and Sexual Activity    Alcohol use: Yes     Comment: social    Drug use: No    Sexual activity: Defer         Current Outpatient Medications:     Linzess 72 MCG capsule capsule, TAKE 1 CAPSULE DAILY 30 MINUTES PRIOR TO A MEAL, Disp: 90 capsule, Rfl: 3    melatonin 5 MG tablet tablet, Take 1 tablet by mouth., Disp: , Rfl:     Multiple Vitamins-Minerals (MULTI-VITAMIN GUMMIES) chewable tablet, Chew daily., Disp: , Rfl:     omeprazole-sodium bicarbonate (ZEGERID)  MG pack packet, Take  by mouth Every Morning Before Breakfast., Disp: , Rfl:     Semaglutide-Weight Management 2.4  "MG/0.75ML solution auto-injector, Inject 2.4 mg under the skin into the appropriate area as directed 1 (One) Time Per Week., Disp: 9 mL, Rfl: 3    Zinc Sulfate (ZINC 15 PO), Take  by mouth., Disp: , Rfl:     Objective     Vital Signs  /78 (BP Location: Left arm, Patient Position: Sitting, Cuff Size: Adult)   Pulse 75   Temp 98 °F (36.7 °C)   Ht 152.4 cm (60\")   Wt 62.7 kg (138 lb 3.2 oz)   SpO2 97%   BMI 26.99 kg/m²   Estimated body mass index is 26.99 kg/m² as calculated from the following:    Height as of this encounter: 152.4 cm (60\").    Weight as of this encounter: 62.7 kg (138 lb 3.2 oz).             Physical Exam  Vitals and nursing note reviewed.   Constitutional:       Appearance: Normal appearance.   HENT:      Head: Normocephalic and atraumatic.   Eyes:      Extraocular Movements: Extraocular movements intact.      Pupils: Pupils are equal, round, and reactive to light.   Cardiovascular:      Rate and Rhythm: Normal rate and regular rhythm.   Pulmonary:      Effort: Pulmonary effort is normal.   Musculoskeletal:         General: Normal range of motion.   Skin:     General: Skin is warm and dry.   Neurological:      Mental Status: She is alert and oriented to person, place, and time.   Psychiatric:         Mood and Affect: Mood normal.         Behavior: Behavior normal.          Lab Review:  Procedure Component Value Ref Range Date/Time   CT Abdomen Pelvis With & Without Contrast [[id number]] Collected: 02/24/25 1020   Order Status: Completed Updated: 02/24/25 1031   Narrative:     CT ABDOMEN PELVIS W WO CONTRAST    Date of Exam: 2/20/2025 1:06 PM EST    Indication: Intermittent epigastric pain for many years.  EGD and colonoscopy are currently up-to-date.  Patient also has acid reflux and constipation..    Comparison: None available.    Technique: Axial CT images were obtained of the abdomen and pelvis before and after the uneventful intravenous administration of 85 mL Isovue 300. Sagittal and " coronal reconstructions were performed.  Automated exposure control and iterative  reconstruction methods were used.    Findings:  LUNG BASES:  Unremarkable without mass or infiltrate.    LIVER: Subcentimeter hypodensity in the left lobe of the liver is too small to further characterize. The liver is otherwise unremarkable.  BILIARY/GALLBLADDER:  Unremarkable  SPLEEN: Unremarkable. A small splenule is noted.  PANCREAS:  Unremarkable  ADRENAL:  Unremarkable  KIDNEYS:  Unremarkable parenchyma with no solid mass identified. No obstruction.  No calculus identified.  GASTROINTESTINAL/MESENTERY:  No evidence of obstruction nor inflammation.  There is a normal air-containing appendix. There is mild colonic diverticulosis without diverticulitis.  MESENTERIC VESSELS:  Patent.  AORTA/IVC:  Normal caliber. There is a retrocardiac left renal vein, normal variant.    RETROPERITONEUM/LYMPH NODES:  Unremarkable    REPRODUCTIVE: Unremarkable.  BLADDER:  Unremarkable    OSSEUS STRUCTURES: There is slight dextroconvex lumbar curvature. There is a transitional vertebra at L5 with partial sacralization on the left. There is minimal anterolisthesis of L4 on L5. Multilevel mild lumbar degenerative disc disease and facet  joint arthropathy.     Impression:     Impression:    1. No acute CT abnormality of the abdomen or pelvis.  2. Mild colonic diverticulosis without diverticulitis.            Assessment and Plan     Diagnoses and all orders for this visit:    1. Encounter for follow-up (Primary)    2. Elevated ALT measurement    3. Gastroparesis    4. History of colonic polyps    5. Chronic idiopathic constipation    6. Myofascial pain    7. Epigastric pain    8. Diverticulosis    9. Degeneration of intervertebral disc of lumbar region, unspecified whether pain present    10. Synovial cyst of wrist, right  -     Ambulatory Referral to Orthopedic Surgery    11. Change of skin color    Plan  Follow-up visit completed with patient  today    Did discuss this referral with our referral coordinator.  She will refax this referral.  Patient was provided the phone number to [facility] to call if she has not heard from them by next week    Regarding the cyst on her wrist, patient would appreciate referral to orthopedics.  She would prefer to go to [facility] versus [facility].  She was also provided the phone number for scheduling as well    Discussed with patient that her skin color changes could be a possible etiology of Raynaud's.  We will hold on treatment at this time.  I did encourage her to do nonpharmacological treatment of keeping hands warm.  There are warming gloves that are for sale that she could consider.    Go to ER if any condition worsens or severe    Plan to follow-up as scheduled in June    Follow Up  Return for Next scheduled follow up.    MALAIKA Salamanca    Part of this note may be an electronic transcription/translation of spoken language to printed text using the Dragon Dictation System.

## 2025-03-17 ENCOUNTER — TELEPHONE (OUTPATIENT)
Dept: INTERNAL MEDICINE | Facility: CLINIC | Age: 60
End: 2025-03-17
Payer: COMMERCIAL

## 2025-03-17 ENCOUNTER — OFFICE VISIT (OUTPATIENT)
Dept: ORTHOPEDIC SURGERY | Facility: CLINIC | Age: 60
End: 2025-03-17
Payer: COMMERCIAL

## 2025-03-17 VITALS
DIASTOLIC BLOOD PRESSURE: 70 MMHG | SYSTOLIC BLOOD PRESSURE: 106 MMHG | HEIGHT: 60 IN | WEIGHT: 138 LBS | BODY MASS INDEX: 27.09 KG/M2

## 2025-03-17 DIAGNOSIS — M25.531 RIGHT WRIST PAIN: ICD-10-CM

## 2025-03-17 DIAGNOSIS — M67.431 GANGLION OF RIGHT WRIST: Primary | ICD-10-CM

## 2025-03-17 PROCEDURE — 99203 OFFICE O/P NEW LOW 30 MIN: CPT | Performed by: STUDENT IN AN ORGANIZED HEALTH CARE EDUCATION/TRAINING PROGRAM

## 2025-03-17 NOTE — TELEPHONE ENCOUNTER
Spoke with patient and notified her that Ozempic is only approved for a patient diagnosed with type 2 diabetes. She verbalized understanding. I will try and complete a PA for Wegovy.    Statement Selected

## 2025-03-17 NOTE — PROGRESS NOTES
[facility] Orthopedic     Office Visit       Date: 03/17/2025   Patient Name: Adriana Diamond  MRN: [id number]  YOB: 1965    Referring Physician: Baylee Rubi AP*     Chief Complaint:   Chief Complaint   Patient presents with    Right Wrist - Pain     History of Present Illness:   Adriana Diamond is a 59 y.o. female right-hand-dominant presented to clinic as new patient complaints of right volar wrist cyst.  She reports noticing a small cyst along the volar aspect of her wrist approximately 1 year ago.'s began with injury or trauma.  She reports 2/10 pain when bumped or hit.  No pain at rest.  Has not attempted any prior bracing, therapy, or aspiration.  No other complaints or concerns.    PMH: migraines, HTN, NICKY, HLD    Subjective   Review of Systems:   Review of Systems   Constitutional:  Negative for chills, fever, unexpected weight gain and unexpected weight loss.   HENT:  Negative for congestion, postnasal drip and rhinorrhea.    Eyes:  Negative for blurred vision.   Respiratory:  Negative for shortness of breath.    Cardiovascular:  Negative for leg swelling.   Gastrointestinal:  Negative for abdominal pain, nausea and vomiting.   Genitourinary:  Negative for difficulty urinating.   Musculoskeletal:  Positive for arthralgias. Negative for gait problem, joint swelling and myalgias.   Skin:  Negative for skin lesions and wound.   Neurological:  Negative for dizziness, weakness, light-headedness and numbness.   Hematological:  Does not bruise/bleed easily.   Psychiatric/Behavioral:  Negative for depressed mood.       Pertinent review of systems per HPI    I reviewed the patient's chief complaint, history of present illness, review of systems, past medical history, surgical history, family history, social history, medications and allergy list in the EMR on 03/17/2025 and agree with the findings  "above.    Objective    Vital Signs:   Vitals:    03/17/25 1350   BP: 106/70   Weight: 62.6 kg (138 lb)   Height: 152.4 cm (60\")     General: No acute distress. Alert and oriented.   Cardiovascular: Palpable radial pulse.   Respiratory: Breathing is nonlabored.   Ortho Exam:  Examination of the right upper extremity demonstrates no deformity.  No skin lesions or abrasions.  There is a small less than 5 x 5 mm palpable superficial cyst along the FCR tendon distally as it courses into the FCR tunnel over the scaphoid.  This is mildly tender to palpation.  No volar or dorsal ganglion cyst.  The radial artery is readily palpable.  She has full wrist and digit range of motion.  Sensations intact light touch of the hand.  Warm well-perfused distally.    Imaging / Studies:    Imaging Results (Last 24 Hours)       Procedure Component Value Units Date/Time    XR Wrist 3+ View Right [[id number]] Resulted: 03/17/25 1402     Updated: 03/17/25 1402    Narrative:      Right Wrist X-Ray    Indication: Pain    Views:  PA, Lateral, and Oblique     Comparison:  None    Findings:  No fractures or dislocation. No bony lesions. Normal soft tissues. Normal   joint spaces.    Impression:   No evidence of bony abnormalities.              Assessment / Plan    Assessment/Plan:   Adriana Diamond is a 59 y.o. female with right wrist volar ganglion cyst overlying the FCR tunnel.    I discussed with the patient their clinical and radiographic findings demonstrate a volar ganglion cyst overlying the FCR tunnel. We had a lengthy discussion regarding the pathophysiology of their diagnosis. The benign nature of a ganglion cyst was stressed.  It was also discussed that while these cysts may cause some local mass effect which may result in minor symptoms of discomfort, these cysts are typically painless.  Both conservative and surgical options were discussed.  Conservative treatments in the form of: observation, aspiration (which is a simple " office procedure but has high recurrence rates, typically greater than 50%), and bracing were presented.  Operative treatments in the form of surgical excision, which has a much lower recurrence rate but does require a trip to operating/procedure room, was presented. Common reasons for surgery were also explained to be due to local discomfort and functional deficits, or to obtain tissue for a definitive histologic diagnosis. After expressing understanding of all options, the patient elects to proceed with wrist bracing and anti-inflammatories.  A wrist brace is provided today.  I will see her back in 6 weeks for reevaluation.  I discussed obtaining a preoperative MRI if not improving with these measures.  They were agreeable with the plan.  All questions and concerns were addressed.       ICD-10-CM ICD-9-CM   1. Ganglion of right wrist  M67.431 727.41   2. Right wrist pain  M25.531 719.43     Follow Up:   Return in about 6 weeks (around 4/28/2025) for Follow Up.      Ashley Malik MD  [facility] Orthopedic & Hand Surgeon

## 2025-03-17 NOTE — TELEPHONE ENCOUNTER
"    Caller: Adriana Diamond \"Ramila\"    Relationship: Self    Best call back number: [phone]     What medication are you requesting: OZEMPIC    What are your current symptoms: INSURANCE WILL NO LONGER COVER THE WEGOVY BUT WILL COVER OZEMPIC.    If a prescription is needed, what is your preferred pharmacy and phone number: [facility] - [address] [phone] [facility] [phone]      Additional notes:  PLEASE CALL AND ADVISE IF AND WHEN THIS CAN BE DONE. PATIENT TOOK LAST DOSE OF MEDICATION 3/16.     "

## 2025-03-18 ENCOUNTER — TELEPHONE (OUTPATIENT)
Dept: ORTHOPEDIC SURGERY | Facility: CLINIC | Age: 60
End: 2025-03-18
Payer: COMMERCIAL

## 2025-03-18 NOTE — TELEPHONE ENCOUNTER
"Caller: Adriana Diamond \"Ramila\"    Relationship to patient: Self    Best call back number: [phone]    Patient is needing: PATIENT NEEDS TO RETURN WRIST BRACE AND WOULD LIKE THAT RETURN INFORMATION         "

## 2025-03-18 NOTE — TELEPHONE ENCOUNTER
Leanne spoke with Agus Dara and gave her the [employer] phone number for returns.    Phone: [phone]

## 2025-03-19 NOTE — TELEPHONE ENCOUNTER
I attempted PA but medication is not covered. I notified patient and let her know that we could refer her to weight management. She is also going to research [employer] to see other options she has. She will give me a call back when she reaches a decision.

## 2025-03-20 ENCOUNTER — TELEPHONE (OUTPATIENT)
Dept: INTERNAL MEDICINE | Facility: CLINIC | Age: 60
End: 2025-03-20
Payer: COMMERCIAL

## 2025-03-20 DIAGNOSIS — R63.5 WEIGHT GAIN: ICD-10-CM

## 2025-03-20 DIAGNOSIS — E66.3 OVERWEIGHT (BMI 25.0-29.9): ICD-10-CM

## 2025-03-20 RX ORDER — TIRZEPATIDE 2.5 MG/.5ML
2.5 INJECTION, SOLUTION SUBCUTANEOUS WEEKLY
Qty: 2 ML | Refills: 0 | Status: SHIPPED | OUTPATIENT
Start: 2025-03-20

## 2025-03-20 NOTE — TELEPHONE ENCOUNTER
Patient has looked on [employer] and would like to proceed in paying out of pocket with that website. They do not distribute wegovy but they have zepbound. I have updated pharmacy on file if you are able to send. It would also need to be sent as the solution on the injectors

## 2025-03-20 NOTE — TELEPHONE ENCOUNTER
"  Caller: Adriana Diamond \"Ramila\"    Relationship: Self    Best call back number: [phone]    What is the best time to reach you: ANYTIME     Who are you requesting to speak with (clinical staff, provider,  specific staff member): ELEANOR      What was the call regarding: THE PATIENT WANTS TO TALK TO ELEANOR ABOUT WEGOVEY SHE STATES THAT SHE TALKED TO HER ABOUT THAT YESTERDAY IN OFFICE       "

## 2025-03-24 RX ORDER — TIRZEPATIDE 2.5 MG/.5ML
2.5 INJECTION, SOLUTION SUBCUTANEOUS WEEKLY
Qty: 2 ML | Refills: 0 | Status: CANCELLED | OUTPATIENT
Start: 2025-03-24

## 2025-03-24 NOTE — TELEPHONE ENCOUNTER
Patient is not sure if she will  rx depending on the cost. I have sent the script to the pharmacy and notified patient.

## 2025-03-24 NOTE — TELEPHONE ENCOUNTER
"Caller: Adriana Diamond \"Ramila\"    Relationship: Self    Best call back number: [phone]    Requested Prescriptions:   [facility]     Pharmacy where request should be sent: [id number] - [place], [place] - [address] [address] - [phone]  - [phone] FX     Last office visit with prescribing clinician: 3/7/2025   Last telemedicine visit with prescribing clinician: Visit date not found   Next office visit with prescribing clinician: 6/16/2025     Additional details provided by patient: PATIENT HAS A PRESCRIPTION SAVINGS CARD SO IT NEEDS TO BE SENT HERE IN ORDER TO SEE HOW MUCH IT WOULD COST. PATIENT IS OUT SINCE YESTERDAY     Does the patient have less than a 3 day supply:  [x] Yes  [] No    Would you like a call back once the refill request has been completed: [x] Yes [] No    If the office needs to give you a call back, can they leave a voicemail: [x] Yes [] No    Serenity Mayorga, [facility]   03/24/25 13:51 EDT     "

## 2025-04-23 DIAGNOSIS — R63.5 WEIGHT GAIN: ICD-10-CM

## 2025-04-23 DIAGNOSIS — E66.3 OVERWEIGHT (BMI 25.0-29.9): ICD-10-CM

## 2025-04-23 NOTE — TELEPHONE ENCOUNTER
"    Caller: Adriana Diamond \"Ramila\"    Relationship: Self    Best call back number: [phone]     Requested Prescriptions:   Requested Prescriptions     Pending Prescriptions Disp Refills    Semaglutide-Weight Management 2.4 MG/0.75ML solution auto-injector 3 mL 0     Sig: Inject 0.75 mL under the skin into the appropriate area as directed 1 (One) Time Per Week.        Pharmacy where request should be sent: [id number] [place] [address] - [phone] [facility] [phone]      Last office visit with prescribing clinician: 3/7/2025   Last telemedicine visit with prescribing clinician: Visit date not found   Next office visit with prescribing clinician: 6/16/2025     Additional details provided by patient: PATIENT IS OUT. SHE IS DUE FOR HER NEXT SHOT ON SUNDAY.     Does the patient have less than a 3 day supply:  [x] Yes  [] No    Would you like a call back once the refill request has been completed: [] Yes [x] No    If the office needs to give you a call back, can they leave a voicemail: [] Yes [x] No    Jerome Allison Rep   04/23/25 14:02 EDT             "

## 2025-04-28 ENCOUNTER — OFFICE VISIT (OUTPATIENT)
Dept: ORTHOPEDIC SURGERY | Facility: CLINIC | Age: 60
End: 2025-04-28
Payer: COMMERCIAL

## 2025-04-28 DIAGNOSIS — M67.431 GANGLION OF RIGHT WRIST: Primary | ICD-10-CM

## 2025-04-28 PROCEDURE — 99213 OFFICE O/P EST LOW 20 MIN: CPT | Performed by: STUDENT IN AN ORGANIZED HEALTH CARE EDUCATION/TRAINING PROGRAM

## 2025-04-28 NOTE — PROGRESS NOTES
[facility] Orthopedic     Office Visit       Date: 04/28/2025   Patient Name: Adriana Diamond  MRN: [id number]  YOB: 1965    Referring Physician: No ref. provider found     Chief Complaint:   Chief Complaint   Patient presents with    Follow-up     6 week follow-up: Ganglion of right wrist     History of Present Illness:   Adriana Diamond is a 59 y.o. female right-hand-dominant presented clinic for follow-up of right wrist volar ganglion cyst overlying the FCR tunnel.  The patient's last clinic visit a wrist brace was provided.  She reports that her cyst is largely unchanged despite a course of bracing.  She continues to have pain when this is bumped or hit.  No other complaints or concerns.    Subjective   Review of Systems:   Review of Systems   HENT: Negative.     Eyes: Negative.    Respiratory: Negative.     Cardiovascular: Negative.    Gastrointestinal: Negative.    Endocrine: Negative.    Genitourinary: Negative.    Musculoskeletal:  Positive for arthralgias.   Allergic/Immunologic: Negative.    Hematological: Negative.    Psychiatric/Behavioral: Negative.        Pertinent review of systems per HPI    I reviewed the patient's chief complaint, history of present illness, review of systems, past medical history, surgical history, family history, social history, medications and allergy list in the EMR on 04/28/2025 and agree with the findings above.    Objective    Vital Signs: There were no vitals filed for this visit.  General: No acute distress. Alert and oriented.   Cardiovascular: Palpable radial pulse.   Respiratory: Breathing is nonlabored.   Ortho Exam:  Examination of the right upper extremity demonstrates no deformity.  No skin lesions or abrasions.  There is a small palpable cyst overlying the FCR tunnel distally.  This measures 7 x 7 mm today.  I cannot palpate any deep cyst formation.  She is mildly  tender along the FCR tendon.  Palpable radial pulse.  She is able to make composite fist.  Sensations intact light touch at the hand.  Warm well-perfused distally.    Imaging / Studies:    Imaging Results (Last 24 Hours)       ** No results found for the last 24 hours. **          Assessment / Plan    Assessment/Plan:   Adriana Diamond is a 59 y.o. female with right wrist volar ganglion cyst overlying the FCR tunnel.    Unfortunately, the patient has not had sustained improvements in her ganglion cyst despite a course of bracing and anti-inflammatories.  She would like to consider surgical excision.  I would like to obtain an MRI for preoperative planning.  This was ordered today.  I will see her back with MRI results.  All questions and concerns were addressed.  She is agreeable.      ICD-10-CM ICD-9-CM   1. Ganglion of right wrist  M67.431 727.41     Follow Up:   Return for Follow Up- After Testing.      Ashley Malik MD  [facility] Orthopedic & Hand Surgeon

## 2025-05-30 ENCOUNTER — HOSPITAL ENCOUNTER (OUTPATIENT)
Dept: MRI IMAGING | Facility: HOSPITAL | Age: 60
Discharge: HOME | End: 2025-05-30
Admitting: STUDENT IN AN ORGANIZED HEALTH CARE EDUCATION/TRAINING PROGRAM
Payer: COMMERCIAL

## 2025-05-30 DIAGNOSIS — M67.431 GANGLION OF RIGHT WRIST: ICD-10-CM

## 2025-05-30 PROCEDURE — 73221 MRI JOINT UPR EXTREM W/O DYE: CPT

## 2025-06-16 ENCOUNTER — OFFICE VISIT (OUTPATIENT)
Dept: INTERNAL MEDICINE | Facility: CLINIC | Age: 60
End: 2025-06-16
Payer: COMMERCIAL

## 2025-06-16 VITALS
OXYGEN SATURATION: 98 % | BODY MASS INDEX: 28.07 KG/M2 | TEMPERATURE: 98 F | HEART RATE: 72 BPM | DIASTOLIC BLOOD PRESSURE: 76 MMHG | HEIGHT: 60 IN | SYSTOLIC BLOOD PRESSURE: 112 MMHG | WEIGHT: 143 LBS

## 2025-06-16 DIAGNOSIS — Z13.1 SCREENING FOR DIABETES MELLITUS: ICD-10-CM

## 2025-06-16 DIAGNOSIS — Z13.29 SCREENING FOR THYROID DISORDER: ICD-10-CM

## 2025-06-16 DIAGNOSIS — M71.331 SYNOVIAL CYST OF WRIST, RIGHT: ICD-10-CM

## 2025-06-16 DIAGNOSIS — Z09 ENCOUNTER FOR FOLLOW-UP: Primary | ICD-10-CM

## 2025-06-16 DIAGNOSIS — E78.5 HYPERLIPIDEMIA, UNSPECIFIED HYPERLIPIDEMIA TYPE: ICD-10-CM

## 2025-06-16 DIAGNOSIS — E66.3 OVERWEIGHT (BMI 25.0-29.9): ICD-10-CM

## 2025-06-16 DIAGNOSIS — R63.5 WEIGHT GAIN: ICD-10-CM

## 2025-06-16 NOTE — PROGRESS NOTES
Office Note     Name: Adriana Diamond    : 1965     MRN: [id number]     Chief Complaint  Primary Care Follow-Up    Subjective     History of Present Illness:  Adriana Diamond is a 59 y.o. female who presents today for chronic care follow-up    Since previous visit in March patient has seen orthopedics regarding her right wrist concern    Patient has followed with [facility] with completion of EGD.  Results included minimal gastritis without H. pylori infection.  Recommendations to continue Zegerid and they will be contacting her to arrange an upper abdominal ultrasound to evaluate epigastric pain further  - Patient states that she is waiting for them to schedule her ultrasound and notify her    Patient requested a refill of her Wegovy to a local pharmacy.  Weight today is 143 pounds.  She states she has been off of this medication for couple weeks and has noticed some weight increase        Past Medical History:   Diagnosis Date    Acute sinusitis     Allergic reaction     Ankle sprain     Arthritis     Cheilosis     Contact dermatitis     Encounter for preventive health examination     Fracture, humerus 2018    Health care maintenance 2021    Herpes simplex type 1 infection     Take valtrex as directed    Hyperlipidemia     Obesity (BMI 30-39.9) 2017    Onychomycosis     Trial of lmisil ointment as perscribed. She will try for 3-4 weeks, if no improvement can try oral meds    Oral thrush     Rash 2016    Snoring     Stomatitis     Unintended weight gain     check tsh       Past Surgical History:   Procedure Laterality Date    BREAST BIOPSY Left     stereo    COLONOSCOPY      FOOT SURGERY      FRACTURE SURGERY  2018    SHOULDER SURGERY  2018    UPPER GASTROINTESTINAL ENDOSCOPY         Social History     Socioeconomic History    Marital status:    Tobacco Use    Smoking status: Never     Passive exposure: Never    Smokeless tobacco: Never   Vaping Use     "Vaping status: Never Used   Substance and Sexual Activity    Alcohol use: Not Currently     Comment: social    Drug use: No    Sexual activity: Yes     Partners: Male     Birth control/protection: Post-menopausal         Current Outpatient Medications:     Linzess 72 MCG capsule capsule, TAKE 1 CAPSULE DAILY 30 MINUTES PRIOR TO A MEAL, Disp: 90 capsule, Rfl: 3    melatonin 5 MG tablet tablet, Take 1 tablet by mouth., Disp: , Rfl:     Multiple Vitamins-Minerals (MULTI-VITAMIN GUMMIES) chewable tablet, Chew daily., Disp: , Rfl:     omeprazole-sodium bicarbonate (ZEGERID)  MG pack packet, Take  by mouth Every Morning Before Breakfast., Disp: , Rfl:     Semaglutide-Weight Management 2.4 MG/0.75ML solution auto-injector, Inject 0.75 mL under the skin into the appropriate area as directed 1 (One) Time Per Week., Disp: 9 mL, Rfl: 1    Zinc Sulfate (ZINC 15 PO), Take  by mouth., Disp: , Rfl:     Objective     Vital Signs  /76 (BP Location: Left arm, Patient Position: Sitting, Cuff Size: Adult)   Pulse 72   Temp 98 °F (36.7 °C)   Ht 152.4 cm (60\")   Wt 64.9 kg (143 lb)   SpO2 98%   BMI 27.93 kg/m²   Estimated body mass index is 27.93 kg/m² as calculated from the following:    Height as of this encounter: 152.4 cm (60\").    Weight as of this encounter: 64.9 kg (143 lb).           Physical Exam  Vitals and nursing note reviewed.   Constitutional:       General: She is awake.      Appearance: Normal appearance. She is well-groomed and overweight.   HENT:      Head: Normocephalic and atraumatic.   Eyes:      Extraocular Movements: Extraocular movements intact.      Pupils: Pupils are equal, round, and reactive to light.   Cardiovascular:      Rate and Rhythm: Normal rate and regular rhythm.   Pulmonary:      Effort: Pulmonary effort is normal.   Musculoskeletal:         General: Normal range of motion.   Skin:     General: Skin is warm and dry.   Neurological:      Mental Status: She is alert and oriented to " person, place, and time.   Psychiatric:         Mood and Affect: Mood normal.         Behavior: Behavior normal. Behavior is cooperative.               Assessment and Plan     Diagnoses and all orders for this visit:    1. Encounter for follow-up (Primary)    2. Synovial cyst of wrist, right    3. Weight gain  -     Discontinue: Semaglutide-Weight Management 2.4 MG/0.75ML solution auto-injector; Inject 0.75 mL under the skin into the appropriate area as directed 1 (One) Time Per Week.  Dispense: 9 mL; Refill: 1  -     Semaglutide-Weight Management 2.4 MG/0.75ML solution auto-injector; Inject 0.75 mL under the skin into the appropriate area as directed 1 (One) Time Per Week.  Dispense: 9 mL; Refill: 1    4. BMI 25.0-25.9,adult  -     Discontinue: Semaglutide-Weight Management 2.4 MG/0.75ML solution auto-injector; Inject 0.75 mL under the skin into the appropriate area as directed 1 (One) Time Per Week.  Dispense: 9 mL; Refill: 1  -     Semaglutide-Weight Management 2.4 MG/0.75ML solution auto-injector; Inject 0.75 mL under the skin into the appropriate area as directed 1 (One) Time Per Week.  Dispense: 9 mL; Refill: 1    5. Overweight (BMI 25.0-29.9)  -     Discontinue: Semaglutide-Weight Management 2.4 MG/0.75ML solution auto-injector; Inject 0.75 mL under the skin into the appropriate area as directed 1 (One) Time Per Week.  Dispense: 9 mL; Refill: 1  -     Semaglutide-Weight Management 2.4 MG/0.75ML solution auto-injector; Inject 0.75 mL under the skin into the appropriate area as directed 1 (One) Time Per Week.  Dispense: 9 mL; Refill: 1    6. Screening for diabetes mellitus  -     Hemoglobin A1c; Future    7. Screening for thyroid disorder  -     TSH Rfx On Abnormal To Free T4; Future    8. Hyperlipidemia, unspecified hyperlipidemia type  -     CBC (No Diff); Future  -     Comprehensive Metabolic Panel; Future  -     Lipid Panel; Future    Plan  Follow-up visit completed with patient today    Continue to follow  with orthopedics as needed    Continue to follow with [facility] for your GI symptoms.  Very glad to know that she has had positive interactions with this office    Labs are ordered and will be obtained prior to her next visit in December    Updated refilled prescription of the weight loss injectable medication sent to pharmacy    Go to ER if any condition worsens or severe    Plan follow-up as scheduled in December    Patient respectfully declined pneumonia vaccine today    Follow Up  Return for Next scheduled follow up.    MALAIKA Salamanca    Part of this note may be an electronic transcription/translation of spoken language to printed text using the Dragon Dictation System.

## 2025-06-20 ENCOUNTER — TRANSCRIBE ORDERS (OUTPATIENT)
Dept: ADMINISTRATIVE | Facility: HOSPITAL | Age: 60
End: 2025-06-20
Payer: COMMERCIAL

## 2025-06-20 DIAGNOSIS — R10.10 PAIN OF UPPER ABDOMEN: Primary | ICD-10-CM

## 2025-06-24 ENCOUNTER — HOSPITAL ENCOUNTER (OUTPATIENT)
Dept: MAMMOGRAPHY | Facility: HOSPITAL | Age: 60
Discharge: HOME | End: 2025-06-24
Admitting: NURSE PRACTITIONER
Payer: COMMERCIAL

## 2025-06-24 DIAGNOSIS — Z12.31 ENCOUNTER FOR SCREENING MAMMOGRAM FOR BREAST CANCER: ICD-10-CM

## 2025-06-24 LAB
NCCN CRITERIA FLAG: NORMAL
TYRER CUZICK SCORE: 5.8

## 2025-06-24 PROCEDURE — 77067 SCR MAMMO BI INCL CAD: CPT

## 2025-06-24 PROCEDURE — 77063 BREAST TOMOSYNTHESIS BI: CPT

## 2025-08-29 ENCOUNTER — HOSPITAL ENCOUNTER (OUTPATIENT)
Dept: ULTRASOUND IMAGING | Facility: HOSPITAL | Age: 60
Discharge: HOME | End: 2025-08-29
Admitting: INTERNAL MEDICINE
Payer: COMMERCIAL

## 2025-08-29 DIAGNOSIS — R10.10 PAIN OF UPPER ABDOMEN: ICD-10-CM

## 2025-08-29 PROCEDURE — 76705 ECHO EXAM OF ABDOMEN: CPT
